# Patient Record
Sex: FEMALE | Race: WHITE | NOT HISPANIC OR LATINO | Employment: OTHER | URBAN - METROPOLITAN AREA
[De-identification: names, ages, dates, MRNs, and addresses within clinical notes are randomized per-mention and may not be internally consistent; named-entity substitution may affect disease eponyms.]

---

## 2023-07-25 ENCOUNTER — HOSPITAL ENCOUNTER (OUTPATIENT)
Facility: HOSPITAL | Age: 88
Discharge: HOME HEALTH | End: 2023-07-27
Attending: EMERGENCY MEDICINE | Admitting: INTERNAL MEDICINE
Payer: MEDICARE

## 2023-07-25 DIAGNOSIS — I10 PRIMARY HYPERTENSION: ICD-10-CM

## 2023-07-25 DIAGNOSIS — M89.8X5 PAIN IN RIGHT FEMUR: ICD-10-CM

## 2023-07-25 DIAGNOSIS — S70.01XA HEMATOMA OF RIGHT HIP, INITIAL ENCOUNTER: Primary | ICD-10-CM

## 2023-07-25 LAB
ALBUMIN SERPL BCP-MCNC: 4 G/DL (ref 3.5–5.2)
ALP SERPL-CCNC: 79 U/L (ref 55–135)
ALT SERPL W/O P-5'-P-CCNC: 21 U/L (ref 10–44)
ANION GAP SERPL CALC-SCNC: 7 MMOL/L (ref 8–16)
AST SERPL-CCNC: 18 U/L (ref 10–40)
BASOPHILS # BLD AUTO: 0.03 K/UL (ref 0–0.2)
BASOPHILS NFR BLD: 0.4 % (ref 0–1.9)
BILIRUB SERPL-MCNC: 0.7 MG/DL (ref 0.1–1)
BUN SERPL-MCNC: 13 MG/DL (ref 8–23)
CALCIUM SERPL-MCNC: 10.8 MG/DL (ref 8.7–10.5)
CHLORIDE SERPL-SCNC: 103 MMOL/L (ref 95–110)
CK SERPL-CCNC: 60 U/L (ref 20–180)
CO2 SERPL-SCNC: 32 MMOL/L (ref 23–29)
CREAT SERPL-MCNC: 0.7 MG/DL (ref 0.5–1.4)
DIFFERENTIAL METHOD: ABNORMAL
EOSINOPHIL # BLD AUTO: 0.1 K/UL (ref 0–0.5)
EOSINOPHIL NFR BLD: 0.8 % (ref 0–8)
ERYTHROCYTE [DISTWIDTH] IN BLOOD BY AUTOMATED COUNT: 13.6 % (ref 11.5–14.5)
EST. GFR  (NO RACE VARIABLE): >60 ML/MIN/1.73 M^2
GLUCOSE SERPL-MCNC: 97 MG/DL (ref 70–110)
HCT VFR BLD AUTO: 41.6 % (ref 37–48.5)
HGB BLD-MCNC: 13.4 G/DL (ref 12–16)
IMM GRANULOCYTES # BLD AUTO: 0.02 K/UL (ref 0–0.04)
IMM GRANULOCYTES NFR BLD AUTO: 0.2 % (ref 0–0.5)
LYMPHOCYTES # BLD AUTO: 0.9 K/UL (ref 1–4.8)
LYMPHOCYTES NFR BLD: 11.3 % (ref 18–48)
MCH RBC QN AUTO: 31.8 PG (ref 27–31)
MCHC RBC AUTO-ENTMCNC: 32.2 G/DL (ref 32–36)
MCV RBC AUTO: 99 FL (ref 82–98)
MONOCYTES # BLD AUTO: 0.4 K/UL (ref 0.3–1)
MONOCYTES NFR BLD: 5.2 % (ref 4–15)
NEUTROPHILS # BLD AUTO: 6.8 K/UL (ref 1.8–7.7)
NEUTROPHILS NFR BLD: 82.1 % (ref 38–73)
NRBC BLD-RTO: 0 /100 WBC
PLATELET # BLD AUTO: 167 K/UL (ref 150–450)
PMV BLD AUTO: 11.6 FL (ref 9.2–12.9)
POTASSIUM SERPL-SCNC: 3.7 MMOL/L (ref 3.5–5.1)
PROT SERPL-MCNC: 6.5 G/DL (ref 6–8.4)
RBC # BLD AUTO: 4.22 M/UL (ref 4–5.4)
SODIUM SERPL-SCNC: 142 MMOL/L (ref 136–145)
WBC # BLD AUTO: 8.33 K/UL (ref 3.9–12.7)

## 2023-07-25 PROCEDURE — 82550 ASSAY OF CK (CPK): CPT | Performed by: NURSE PRACTITIONER

## 2023-07-25 PROCEDURE — 25000003 PHARM REV CODE 250: Performed by: INTERNAL MEDICINE

## 2023-07-25 PROCEDURE — G0378 HOSPITAL OBSERVATION PER HR: HCPCS

## 2023-07-25 PROCEDURE — 85025 COMPLETE CBC W/AUTO DIFF WBC: CPT | Performed by: NURSE PRACTITIONER

## 2023-07-25 PROCEDURE — 99285 EMERGENCY DEPT VISIT HI MDM: CPT | Mod: 25

## 2023-07-25 PROCEDURE — 94760 N-INVAS EAR/PLS OXIMETRY 1: CPT

## 2023-07-25 PROCEDURE — 25000003 PHARM REV CODE 250: Performed by: NURSE PRACTITIONER

## 2023-07-25 PROCEDURE — 80053 COMPREHEN METABOLIC PANEL: CPT | Performed by: NURSE PRACTITIONER

## 2023-07-25 RX ORDER — POLYETHYLENE GLYCOL 3350 17 G/17G
17 POWDER, FOR SOLUTION ORAL
COMMUNITY
Start: 2023-06-30

## 2023-07-25 RX ORDER — ATORVASTATIN CALCIUM 20 MG/1
1 TABLET, FILM COATED ORAL DAILY
COMMUNITY

## 2023-07-25 RX ORDER — ACETAMINOPHEN 500 MG
1000 TABLET ORAL
Status: COMPLETED | OUTPATIENT
Start: 2023-07-25 | End: 2023-07-25

## 2023-07-25 RX ORDER — AMLODIPINE BESYLATE 5 MG/1
5 TABLET ORAL DAILY
Status: DISCONTINUED | OUTPATIENT
Start: 2023-07-25 | End: 2023-07-27 | Stop reason: HOSPADM

## 2023-07-25 RX ORDER — SENNOSIDES 8.6 MG/1
8.6 TABLET ORAL
Status: ON HOLD | COMMUNITY
Start: 2023-07-01 | End: 2023-07-27 | Stop reason: HOSPADM

## 2023-07-25 RX ORDER — VIT C/E/ZN/COPPR/LUTEIN/ZEAXAN 250MG-90MG
1000 CAPSULE ORAL
COMMUNITY

## 2023-07-25 RX ORDER — CALCIUM CARBONATE 200(500)MG
500 TABLET,CHEWABLE ORAL DAILY
COMMUNITY

## 2023-07-25 RX ORDER — TALC
6 POWDER (GRAM) TOPICAL NIGHTLY PRN
Status: DISCONTINUED | OUTPATIENT
Start: 2023-07-25 | End: 2023-07-27 | Stop reason: HOSPADM

## 2023-07-25 RX ORDER — DOCUSATE SODIUM 100 MG/1
100 CAPSULE, LIQUID FILLED ORAL 2 TIMES DAILY
COMMUNITY
Start: 2023-06-30

## 2023-07-25 RX ORDER — ATORVASTATIN CALCIUM 20 MG/1
20 TABLET, FILM COATED ORAL NIGHTLY
Status: DISCONTINUED | OUTPATIENT
Start: 2023-07-25 | End: 2023-07-27 | Stop reason: HOSPADM

## 2023-07-25 RX ORDER — ACETAMINOPHEN 325 MG/1
650 TABLET ORAL EVERY 8 HOURS PRN
Status: DISCONTINUED | OUTPATIENT
Start: 2023-07-25 | End: 2023-07-27 | Stop reason: HOSPADM

## 2023-07-25 RX ORDER — ASPIRIN 81 MG/1
81 TABLET ORAL DAILY
COMMUNITY

## 2023-07-25 RX ORDER — AMLODIPINE BESYLATE 5 MG/1
5 TABLET ORAL DAILY
COMMUNITY
End: 2023-10-03 | Stop reason: SDUPTHER

## 2023-07-25 RX ORDER — SODIUM CHLORIDE 0.9 % (FLUSH) 0.9 %
10 SYRINGE (ML) INJECTION
Status: DISCONTINUED | OUTPATIENT
Start: 2023-07-25 | End: 2023-07-27 | Stop reason: HOSPADM

## 2023-07-25 RX ORDER — ASPIRIN 81 MG/1
81 TABLET ORAL DAILY
Status: DISCONTINUED | OUTPATIENT
Start: 2023-07-26 | End: 2023-07-27 | Stop reason: HOSPADM

## 2023-07-25 RX ADMIN — AMLODIPINE BESYLATE 5 MG: 5 TABLET ORAL at 08:07

## 2023-07-25 RX ADMIN — ATORVASTATIN CALCIUM 20 MG: 20 TABLET, FILM COATED ORAL at 08:07

## 2023-07-25 RX ADMIN — ACETAMINOPHEN 1000 MG: 500 TABLET ORAL at 10:07

## 2023-07-25 NOTE — PLAN OF CARE
07/25/23 1349   ED Admissions Case Approval   ED Admissions Case Approval CM Approved         Utilization review    Chart review complete. Admission criteria MET at OBSERVATION level of care. Please see review below.     General Criteria: Observation Care - Observation Care Admission Criteria by Nichole Rothman RN       Met: Reviewed on 7/25/2023 by Nichole Rothman RN       Created Using Review Status Review Entered   [employer]icia® Completed 7/25/2023 1349       Created By   Nichole Rothman RN       Criteria Set Name - Subset   General Criteria: Observation Care - Observation Care Admission Criteria      Criteria Review      Observation Care Admission Criteria    Most Recent : Nichole Rothman Most Recent Date: 07/25/2023 1:49 PM    (X) Observation care [A] [B] [C] [D] is indicated for  ALL  of the following  (1) (2) (3) (4)    (5) (6) (7) (8) (9) (10):       (X) Clinical care (eg, testing, monitoring, or treatment) needed beyond the usual emergency department       time frame (eg, 3 to 4 hours)       (X) Clinical care needed is not appropriate for a lower level of care (ie, discharge to outpatient       setting not appropriate).       (X) Patient has clinical condition for which observation care is needed, as indicated by  1 or       more  of the following :          (X) Other condition or finding (eg, laboratory test, imaging study, sign, symptom) that suggests          a need for continued care, as indicated by  1 or more  of the following  (11) (15):             (X) Acute need to establish diagnosis (eg, repeat or continued testing or clinical assessments             are needed)             (X) Acute treatment needed

## 2023-07-25 NOTE — ED TRIAGE NOTES
C/o losing her balance while trying to get to the bathroom this morning and falling hitting her posterior head on the corner of the wall. Patient with large amount of swelling to right hip/thigh area. Patient doesn't recall hitting her hip. Denies loc.

## 2023-07-25 NOTE — ED PROVIDER NOTES
"Encounter Date: 7/25/2023       History     Chief Complaint   Patient presents with    Fall     Ashley Mak is a 87 y.o. female with PMH of CAD, HTN who presents to the ED for evaluation of head trauma and right hip pain after fall.  Reports slipping on wet floor while trying to get to the bathroom causing her to fall. She hit her posterior head on the wall prior to falling onto the floor. There was no reported LOC.   She presents with pain to her posterior head with associated swelling in addition to R hip pain with swelling.   She also reports posterior neck pain that has been chronic since recent C1 fracture on 06/24/23 resulting from a fall. She has a soft neck collar that she reports she only wears when needed.   She has no numbness/tingling BUE/BLE.   Large amount of swelling with bruising to R hip with only mild pain. She reports that she was able to walk after fall without difficulty.   Denies use of blood thinners; she does take ASA daily.  She is originally from [place] and is here visiting her daughter.       The history is provided by the patient.   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 No pertinent family history.  Social History     Tobacco Use    Smoking status: Never    Smokeless tobacco: Never   Substance Use Topics    Alcohol use: Not Currently    Drug use: Never     Review of Systems   Constitutional:  Negative for fever.   HENT:  Negative for sore throat.    Respiratory:  Negative for chest tightness and shortness of breath.    Cardiovascular:  Negative for chest pain.   Gastrointestinal:  Negative for abdominal pain and nausea.   Genitourinary:  Negative for dysuria, frequency and urgency.   Musculoskeletal:  Positive for " arthralgias (R hip) and gait problem. Negative for back pain.   Skin:  Positive for wound (R facial bruising from previous fall; R hip). Negative for rash.   Neurological:  Negative for dizziness, weakness, light-headedness and numbness.   Hematological:  Does not bruise/bleed easily.     Physical Exam     Initial Vitals [07/25/23 0911]   BP Pulse Resp Temp SpO2   (!) 190/81 69 18 96.9 °F (36.1 °C) 99 %      MAP       --         Physical Exam    Nursing note and vitals reviewed.  Constitutional: She appears well-developed and well-nourished.   HENT:   Head: Normocephalic and atraumatic.       R facial bruising    Eyes: Conjunctivae and EOM are normal. Pupils are equal, round, and reactive to light.   Neck: Neck supple.   Cardiovascular:  Normal rate, regular rhythm, normal heart sounds and intact distal pulses.           Pulmonary/Chest: Breath sounds normal.   Abdominal: Abdomen is soft. Bowel sounds are normal.   Musculoskeletal:      Cervical back: Neck supple.      Right hip: Deformity and tenderness present. Decreased range of motion.      Comments: + 2 R pedal pulse with good cap refill      Neurological: She is alert and oriented to person, place, and time. She has normal strength. No cranial nerve deficit or sensory deficit. GCS eye subscore is 4. GCS verbal subscore is 5. GCS motor subscore is 6.   Skin: Skin is warm and dry. Capillary refill takes less than 2 seconds. Bruising (R facial from previous fall) and ecchymosis (R hip) noted.   Psychiatric: She has a normal mood and affect. Her behavior is normal. Judgment and thought content normal.             ED Course   Procedures  Labs Reviewed   CBC W/ AUTO DIFFERENTIAL - Abnormal; Notable for the following components:       Result Value    MCV 99 (*)     MCH 31.8 (*)     Lymph # 0.9 (*)     Gran % 82.1 (*)     Lymph % 11.3 (*)     All other components within normal limits   COMPREHENSIVE METABOLIC PANEL - Abnormal; Notable for the following components:     CO2 32 (*)     Calcium 10.8 (*)     Anion Gap 7 (*)     All other components within normal limits   CK          Imaging Results              CT Pelvis Without Contrast (Final result)  Result time 07/25/23 10:39:24      Final result by Audie Mccloud Jr., MD (07/25/23 10:39:24)                   Impression:      1. Soft tissue mass projecting over the right gluteal region related to a soft tissue hematoma formation, though no evidence of abnormality involving the at adjacent femur.  2. 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  3. Small sclerotic line projecting along the apical edge of the S1 vertebral body likely related to an old fragility fracture may be further addressed by MR evaluation.  4. The above findings were conveyed to Dr. Romero on 07/25/2023 at 10:38.      Electronically signed by: Audie Mccloud MD  Date:    07/25/2023  Time:    10:39               Narrative:    EXAMINATION:  CT PELVIS WITHOUT CONTRAST    CLINICAL HISTORY:  Pelvic trauma;hematoma/swelling R hip;    TECHNIQUE:  Standard cross-section evaluation of the pelvis was completed utilizing a multidetector scanner with supplemental coronal inside obstruction images also included along with the standard dataset provided.    COMPARISON:  No previous comparison study is made available.    FINDINGS:  Soft tissue mass projects over the patient's right greater trochanter showing evidence of fairly advanced inflammatory density change attributed towards a large posttraumatic hematoma formation, deep edge approaching the greater tubercle, though the long shaft of the femur appears intact.  Femoral head is well seated within the acetabular compartment with evidence of marginal sclerosis noted about the apical margin.  Concentric narrowing of the acetabular cartilage space.  Small bone island is imbedded within the lateral edge of the right hemipelvis.  Left hip shows evidence of normal articulation with  the articular cartilage space, coarse trabecular markings along the apical edge attributed towards early AVN.  No evidence of fracture.  There is a subtle sclerotic line traversing the apical edge of the sacrum on the sagittal inspection potentially related to an old fragility fracture.  There is no significant widening the symphysis pubis.  The SI joints are mildly arthritic prominent osteophytic spur projected over the left SI joint.  The pelvic structures revealed no evidence of significant abnormality.  There is evidence of extensive atheromatous calcification of the abdominal aorta and common iliac vessels.  The urinary bladder is filled to capacity.                                       CT Cervical Spine Without Contrast (Final result)  Result time 07/25/23 10:21:56      Final result by Audie Mccloud Jr., MD (07/25/23 10:21:56)                   Impression:      1. No CT evidence of acute traumatic injury.  2. Mild predominant right-sided mid to lower lumbar spine facet arthrosis.  3. Chronic degenerative disc disease isolated to the C5/C6 intervertebral disc with bilateral uncinate joint spur formation dominant towards the left resulting root impaction.  4. Chronic atlantal dens osteoarthritis with prominent osteophytic spurring projecting towards the right of midline.      Electronically signed by: Audie Mccloud MD  Date:    07/25/2023  Time:    10:21               Narrative:      CMS MANDATED QUALITY DATA - CT RADIATION - 436    All CT scans at this facility utilize dose modulation, iterative reconstruction, and/or weight based dosing when appropriate to reduce radiation dose to as low as reasonably achievable    EXAMINATION:  CT CERVICAL SPINE WITHOUT CONTRAST    CLINICAL HISTORY:  Neck trauma (Age >= 65y);hx C1 fracture;    TECHNIQUE:  Low dose axial images, sagittal and coronal reformations were performed though the cervical spine.  Contrast was not  administered.    COMPARISON:  None.    FINDINGS:  Skull Base and Craniocervical Junction (partially imaged): Chronic degenerative changes across the atlantal dens articulation with prominent osteophytic spurring propagating from the dorsal edge of the anterior arch of C1, however the ring of C1 remains intact.  The atlantal dens interval appears well maintained.    Spinal Alignment: Marked hyperlordosis of the spinal convexity.    Vertebrae: No evidence of spinal compression fracture.    Discs: Chronic degenerative disc disease narrowing established at the C5/C6 interspace level with prominent dorsal osteophytic spurring encroaching upon the spinal canal.  Mild bilateral uncinate joint spur formation.    Degenerative findings: Mild multilevel predominant right-sided lower cervical spine facet arthrosis.    Paraspinal muscles & soft tissues: Extensive atheromatous calcifications noted about the carotid bifurcation leftward.  Low-density nodule is imbedded within the right thyroid gland as an incidental finding.  Mild apical pleural capping.                                       X-Ray Femur Ap/Lat Right (Final result)  Result time 07/25/23 10:27:57      Final result by Audie Mccloud Jr., MD (07/25/23 10:27:57)                   Impression:      Soft tissue induration over the gluteal soft tissues nonspecific finding may be attributed towards a post inflammatory process pressure related trochanteric bursitis.  If so desired, further evaluation with MRI is in order      Electronically signed by: Audie Mccloud MD  Date:    07/25/2023  Time:    10:27               Narrative:    EXAMINATION:  XR FEMUR 2 VIEW RIGHT    CLINICAL HISTORY:  Other specified disorders of bone, thigh    TECHNIQUE:  AP and lateral views of the right femur were performed.    COMPARISON:  None    FINDINGS:  Multi views the right femur in the AP and lateral projection reveal mild narrowing of the articular cartilage space with maintenance  involving the sphericity of the femoral head.  Long shaft of the femur appears well maintained with preservation involving the cortical outline.  There are prominent atheromatous calcifications of the extremity vessels.  Prominent induration and soft tissue inflammatory changes are established over the lateral gluteal soft tissues factors that may reflect factors of trochanteric bursitis.  Further evaluation with MRI is advised.  Patient has undergone previous right knee arthroplasty with cemented tibial component.                                       CT Head Without Contrast (Final result)  Result time 07/25/23 10:17:37      Final result by Audie Mccloud Jr., MD (07/25/23 10:17:37)                   Impression:      1. 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  2. Mild prominence of the bilateral ventricles with evidence of marginal areas of low-density suspect for mild transependymal resorption of CSF.  3. Mild cerebral atrophy with superimposed chronic deep white matter ischemia.      Electronically signed by: Audie Mccloud MD  Date:    07/25/2023  Time:    10:17               Narrative:    EXAMINATION:  CT HEAD WITHOUT CONTRAST    CLINICAL HISTORY:  Head trauma, minor (Age >= 65y);    TECHNIQUE:  Low dose axial images were obtained through the head.  Coronal and sagittal reformations were also performed. Contrast was not administered.    COMPARISON:  Correlation is made with the patient's previous outside report 06/24/2023.  However, the patient has prior imaging studies not available for direct review.    FINDINGS:  The reference is predominant solid and cystic mass in the infratentorial compartment right infratentorial fossa has been described from previous exam with soft tissue component along the anterior edge reproducing marked mass effect upon the brainstem  structures reproducing leftward shift of the midbrain region.  The left cerebellar hemisphere appears unremarkable.  There is evidence of significant mass effect imposed upon the 4th ventricle.  Findings inducing minimal hydronephrosis of the lateral ventricles.  Marginal areas low-density are identified about the posterior and anterior horns of lateral ventricles secondary to transependymal CSF.  There is pre-existing cerebral atrophy marked deep white matter ischemic changes.  No evidence of intracranial hemorrhage.  Orbits retrobulbar compartments are normal.                                       Medications   sodium chloride 0.9% flush 10 mL (has no administration in time range)   melatonin tablet 6 mg (has no administration in time range)   acetaminophen tablet 650 mg (has no administration in time range)   acetaminophen tablet 1,000 mg (1,000 mg Oral Given 7/25/23 1049)     Medical Decision Making:   Differential Diagnosis:   ICH, SDH, SAH, Skull fx,   Cervical fx  R hip fx, hematoma   Clinical Tests:   Lab Tests: Ordered and Reviewed  Radiological Study: Ordered and Reviewed  ED Management:  Evaluation of an 86 yo female with mechanical fall at home with head trauma, neck pain and R hip pain with swelling/bruising.   Presents with no focal deficits on exam. R sided facial bruising that patient notes is old from previous fall about 1 mo ago (06/25/23) where she also suffered a C 1 fx.   R hip with large hematoma. Good distal pulses. Remains with full ROM, no limb shortening.  CT head shows no acute changes. 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  2. Mild prominence of the bilateral ventricles with evidence of marginal areas of low-density suspect for mild transependymal resorption of CSF.    Ct cervical spine with no acute traumatic injury    CT  Pelvis   1.Soft tissue mass projecting over the right gluteal region related to a soft tissue hematoma formation, though no evidence of abnormality involving the at adjacent femur.  2. 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  3. Small sclerotic line projecting along the apical edge of the S1 vertebral body likely related to an old fragility fracture may be further addressed by MR evaluation.    Lab workup with stable H/H  Patient also examined by collaborating provider. Given large hematoma; will admit to observation to monitor hematoma.  Dr. Romero spoke to Dr. Moore who agrees to admit patient for observation.   .ew                        Clinical Impression:   Final diagnoses:  [M89.8X5] Pain in right femur  [S70.01XA] Hematoma of right hip, initial encounter (Primary)        ED Disposition Condition    Observation Stable                Shasha Rothman NP  07/25/23 8265

## 2023-07-26 PROBLEM — I25.10 CAD (CORONARY ARTERY DISEASE): Status: ACTIVE | Noted: 2023-07-26

## 2023-07-26 PROBLEM — S30.0XXA TRAUMATIC HEMATOMA OF BUTTOCK: Status: ACTIVE | Noted: 2023-07-26

## 2023-07-26 PROBLEM — I10 PRIMARY HYPERTENSION: Status: ACTIVE | Noted: 2023-07-26

## 2023-07-26 LAB
BASOPHILS # BLD AUTO: 0.03 K/UL (ref 0–0.2)
BASOPHILS NFR BLD: 0.4 % (ref 0–1.9)
CK SERPL-CCNC: 51 U/L (ref 20–180)
DIFFERENTIAL METHOD: ABNORMAL
EOSINOPHIL # BLD AUTO: 0.1 K/UL (ref 0–0.5)
EOSINOPHIL NFR BLD: 1.8 % (ref 0–8)
ERYTHROCYTE [DISTWIDTH] IN BLOOD BY AUTOMATED COUNT: 13.4 % (ref 11.5–14.5)
HCT VFR BLD AUTO: 37.1 % (ref 37–48.5)
HGB BLD-MCNC: 12 G/DL (ref 12–16)
IMM GRANULOCYTES # BLD AUTO: 0.03 K/UL (ref 0–0.04)
IMM GRANULOCYTES NFR BLD AUTO: 0.4 % (ref 0–0.5)
LYMPHOCYTES # BLD AUTO: 1.3 K/UL (ref 1–4.8)
LYMPHOCYTES NFR BLD: 16.4 % (ref 18–48)
MCH RBC QN AUTO: 32 PG (ref 27–31)
MCHC RBC AUTO-ENTMCNC: 32.3 G/DL (ref 32–36)
MCV RBC AUTO: 99 FL (ref 82–98)
MONOCYTES # BLD AUTO: 0.5 K/UL (ref 0.3–1)
MONOCYTES NFR BLD: 5.9 % (ref 4–15)
NEUTROPHILS # BLD AUTO: 5.8 K/UL (ref 1.8–7.7)
NEUTROPHILS NFR BLD: 75.1 % (ref 38–73)
NRBC BLD-RTO: 0 /100 WBC
PLATELET # BLD AUTO: 153 K/UL (ref 150–450)
PMV BLD AUTO: 11.5 FL (ref 9.2–12.9)
RBC # BLD AUTO: 3.75 M/UL (ref 4–5.4)
WBC # BLD AUTO: 7.75 K/UL (ref 3.9–12.7)

## 2023-07-26 PROCEDURE — 94760 N-INVAS EAR/PLS OXIMETRY 1: CPT

## 2023-07-26 PROCEDURE — 25000003 PHARM REV CODE 250: Performed by: NURSE PRACTITIONER

## 2023-07-26 PROCEDURE — 25000003 PHARM REV CODE 250: Performed by: INTERNAL MEDICINE

## 2023-07-26 PROCEDURE — 82550 ASSAY OF CK (CPK): CPT | Performed by: NURSE PRACTITIONER

## 2023-07-26 PROCEDURE — 99222 PR INITIAL HOSPITAL CARE,LEVL II: ICD-10-PCS | Mod: ,,, | Performed by: PHYSICIAN ASSISTANT

## 2023-07-26 PROCEDURE — 99222 1ST HOSP IP/OBS MODERATE 55: CPT | Mod: ,,, | Performed by: PHYSICIAN ASSISTANT

## 2023-07-26 PROCEDURE — 97165 OT EVAL LOW COMPLEX 30 MIN: CPT

## 2023-07-26 PROCEDURE — 25000003 PHARM REV CODE 250: Performed by: PHYSICIAN ASSISTANT

## 2023-07-26 PROCEDURE — 97162 PT EVAL MOD COMPLEX 30 MIN: CPT

## 2023-07-26 PROCEDURE — 36415 COLL VENOUS BLD VENIPUNCTURE: CPT | Performed by: NURSE PRACTITIONER

## 2023-07-26 PROCEDURE — 85025 COMPLETE CBC W/AUTO DIFF WBC: CPT | Performed by: NURSE PRACTITIONER

## 2023-07-26 PROCEDURE — G0378 HOSPITAL OBSERVATION PER HR: HCPCS

## 2023-07-26 RX ORDER — DOCUSATE SODIUM 100 MG/1
100 CAPSULE, LIQUID FILLED ORAL 2 TIMES DAILY
Status: DISCONTINUED | OUTPATIENT
Start: 2023-07-26 | End: 2023-07-27 | Stop reason: HOSPADM

## 2023-07-26 RX ORDER — POLYETHYLENE GLYCOL 3350 17 G/17G
17 POWDER, FOR SOLUTION ORAL DAILY
Status: DISCONTINUED | OUTPATIENT
Start: 2023-07-26 | End: 2023-07-26

## 2023-07-26 RX ORDER — POLYETHYLENE GLYCOL 3350 17 G/17G
17 POWDER, FOR SOLUTION ORAL DAILY
Status: DISCONTINUED | OUTPATIENT
Start: 2023-07-26 | End: 2023-07-27 | Stop reason: HOSPADM

## 2023-07-26 RX ADMIN — DOCUSATE SODIUM 100 MG: 100 CAPSULE, LIQUID FILLED ORAL at 08:07

## 2023-07-26 RX ADMIN — POLYETHYLENE GLYCOL 3350 17 G: 17 POWDER, FOR SOLUTION ORAL at 09:07

## 2023-07-26 RX ADMIN — ASPIRIN 81 MG: 81 TABLET, COATED ORAL at 09:07

## 2023-07-26 RX ADMIN — DOCUSATE SODIUM 100 MG: 100 CAPSULE, LIQUID FILLED ORAL at 09:07

## 2023-07-26 RX ADMIN — ACETAMINOPHEN 650 MG: 325 TABLET ORAL at 12:07

## 2023-07-26 RX ADMIN — ATORVASTATIN CALCIUM 20 MG: 20 TABLET, FILM COATED ORAL at 08:07

## 2023-07-26 RX ADMIN — AMLODIPINE BESYLATE 5 MG: 5 TABLET ORAL at 09:07

## 2023-07-26 NOTE — PLAN OF CARE
Problem: Fall Injury Risk  Goal: Absence of Fall and Fall-Related Injury  Outcome: Ongoing, Progressing     Problem: Skin Injury Risk Increased  Goal: Skin Health and Integrity  Outcome: Ongoing, Progressing     Problem: Pain Acute  Goal: Acceptable Pain Control and Functional Ability  Outcome: Ongoing, Progressing

## 2023-07-26 NOTE — PT/OT/SLP EVAL
"Occupational Therapy   Evaluation    Name: Ashley Mak  MRN: [id number]  Admitting Diagnosis: Traumatic hematoma of buttock  Recent Surgery: * No surgery found *      Recommendations:     Discharge Recommendations: home with home health, home health PT  Discharge Equipment Recommendations:  none  Barriers to discharge:  Other (Comment) (Increased weakness)    Assessment:     Ashley Mak is a 87 y.o. female with a medical diagnosis of Traumatic hematoma of buttock.  She presents with performance deficits affecting function: weakness, impaired endurance, impaired self care skills, impaired balance, gait instability, impaired functional mobility, decreased upper extremity function, decreased safety awareness.      Rehab Prognosis: Fair; patient would benefit from acute skilled OT services to address these deficits and reach maximum level of function.       Plan:     Patient to be seen 5 x/week to address the above listed problems via self-care/home management, therapeutic activities, therapeutic exercises  Plan of Care Expires: 08/09/23  Plan of Care Reviewed with: patient, daughter    Subjective     Chief Complaint: "I can forget sometimes."  Patient/Family Comments/goals: To go home.    Occupational Profile:  Living Environment: Pt lives with family in [place] in [facility] with 1 [address].  Previous level of function: Pt and daughter report independent > MOD A occasionally "when I need it" per Pt.   Equipment Used at Home: cane, straight, rollator, shower chair, grab bar  Assistance upon Discharge: Family    Pain/Comfort:  Pain Rating 1: 0/10  Pain Rating Post-Intervention 1: 0/10    Patients cultural, spiritual, Adventism conflicts given the current situation: no    Objective:     Communicated with: Nursing prior to session.  Patient found HOB elevated with peripheral IV upon OT entry to room.    General Precautions: Standard, fall  Orthopedic Precautions: N/A  Braces: N/A  Respiratory Status: Room air    Occupational " Performance:    Bed Mobility:    Patient completed Rolling/Turning to Left with  contact guard assistance  Patient completed Supine to Sit with contact guard assistance    Functional Mobility/Transfers:  Patient completed Sit <> Stand Transfer with contact guard assistance  with  rolling walker   Patient completed Toilet Transfer Step Transfer technique with modified independence with  hand-held assist and grab bars  Functional Mobility: Pt ambulated ~20 feet to and from bathroom using RW.    Activities of Daily Living:  Grooming: stand by assistance for  safety in standing oral care and face washing at sink.   Upper Body Dressing: moderate assistance to bring gown over shoulders seated EOB.  Lower Body Dressing: stand by assistance for safety with seated balance.  Toileting: modified independence with RW and grab bar. Pt able to lower brief to knees and complete hygiene.     Cognitive/Visual Perceptual:  Cognitive/Psychosocial Skills:     -       Oriented to: Person, Place, Time, and Situation   -       Follows Commands/attention:Follows multistep  commands  -       Communication: clear/fluent  -       Memory: Pt with difficulty recalling details of [facility] which began following recent fall.     Physical Exam:  Balance:    -       Good > poor balance noted in sitting with significant posterior lean during dressing with Pt verbalizing as new occurrence since fall. Fair standing balance noted with 2 minor LOB in bathroom while standing at sink.   Postural examination/scapula alignment:    -       Rounded shoulders  -       Forward head  Skin integrity: Bruising of right side of face.  Upper Extremity Range of Motion:     -       Right Upper Extremity: WFL  -       Left Upper Extremity: WFL  Upper Extremity Strength:    -       Right Upper Extremity: WFL  -       Left Upper Extremity: WFL   Strength:    -       Right Upper Extremity: WFL  -       Left Upper Extremity: WFL    [facility] 6 Click ADL:  [facility] Total  "Score: 19    Treatment & Education:  OT evaluation completed with Pt and daughter present. Both educated on role of OT and POC as well as trigger finger massage for right 4th digit.     Patient left ambulatory in room/freeman with PT and  nursing notified and daughter present    GOALS:   Multidisciplinary Problems       Occupational Therapy Goals          Problem: Occupational Therapy    Goal Priority Disciplines Outcome Interventions   Occupational Therapy Goal     OT, PT/OT     Description: Pt to perform UB dressing with MOD I and seated EOB.  Pt to perform LB dressing with MOD I seated EOB.   Pt to perform level functional transfers required for ADL's with MOD I, RW level.  Pt to demonstrate Good dynamic standing balance as required to perform ADL's from standing level.  Pt to complete standing ADL task for >5 minutes with no LOB occurrences for increased safety and independence upon discharge.                        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

## 2023-07-26 NOTE — PT/OT/SLP EVAL
"Physical Therapy Evaluation     Patient Name: Ashley Mak   MRN: [id number]  Recent Surgery: * No surgery found *      Recommendations:     Discharge Recommendations: home with home health, home health PT   Discharge Equipment Recommendations: none   Barriers to discharge: Increased level of assist    Assessment:     Ashley Mak is a 87 y.o. female admitted with a medical diagnosis of Traumatic hematoma of buttock. She presents with the following impairments/functional limitations: weakness, impaired endurance, impaired self care skills, impaired functional mobility, gait instability, impaired balance, decreased lower extremity function, decreased safety awareness, decreased ROM that are causing the pt to have decreased independence and safety with all gait and mobility tasks.  Pt requires skilled PT treatment to increase independence and safety with all gait and mobility tasks to return to home    Rehab Prognosis: Fair; patient would benefit from acute PT services to address these deficits and reach maximum level of function.    Plan:     During this hospitalization, patient to be seen 5 x/week to address the above listed problems via gait training, therapeutic activities, therapeutic exercises    Plan of Care Expires: 08/02/23    Subjective     Chief Complaint: none stated  Patient Comments/Goals: "I'm feeling better"  Pain/Comfort:  Pain Rating 1: 0/10 (stated that "movement helped")    Social History:  Living Environment: Patient lives with their child(bryon) in a single story home with number of outside stair(s): 0  Prior Level of Function: Prior to admission, patient was modified independent  Equipment Used at Home: cane, straight, rollator, shower chair  DME owned (not currently used): none  Assistance Upon Discharge: family    Objective:     Communicated with pt prior to session. Patient found sitting edge of bed with peripheral IV upon PT entry to room.    General Precautions: Standard, fall   Orthopedic " Precautions: N/A   Braces: N/A    Respiratory Status: Room air    Exams:  Cognition: Patient is oriented to Person and Place  RLE ROM: WFL except hip AROM  RLE Strength: Deficits: 4/5 to ankle and knee, 2/5 to hip  LLE ROM: WFL except hip AROM  LLE Strength: Deficits: 4/5 to ankle and knee, 2/5 to hip  Fine Motor Coordination:    -       Intact  Gross Motor Coordination: WFL  Postural Exam: Patient presented with the following abnormalities:    -       Rounded shoulders  -       Forward head  -       Kyphosis  Sensation:    -       Intact  Skin Integrity/Edema:     -       Skin integrity: Thin  -       Edema: None noted BLEs    Functional Mobility:  Gait belt applied - Yes  Bed Mobility  Rolling Right: contact guard assistance  Scooting: minimum assistance  Sit to Supine: contact guard assistance for trunk management  Transfers  Sit to Stand: stand by assistance with rolling walker  Gait  Patient ambulated 50' with rolling walker and contact guard assistance. Patient demonstrates occasional unsteady gait.  Balance  Sitting: contact guard assistance  Standing: contact guard assistance    Therapeutic Activities and Exercises:   Patient educated on role of acute care PT and PT POC    AM-PAC 6 CLICK MOBILITY  Total Score:18    Patient left HOB elevated with all lines intact and call button in reach.    GOALS:   Multidisciplinary Problems       Physical Therapy Goals          Problem: Physical Therapy    Goal Priority Disciplines Outcome Goal Variances Interventions   Physical Therapy Goal     PT, PT/OT Ongoing, Progressing     Description: Patient will increase functional independence with mobility by performin. Supine to sit with Modified Wilson  2. Sit to supine with Modified Wilson  3. Rolling to Left and Right with Modified Wilson.  4. Sit to stand transfer with Supervision  5. Bed to chair transfer with Supervision using Rolling Walker  6. Gait  x 100 feet with Stand-by Assistance using  "Rolling Walker.   7. 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

## 2023-07-26 NOTE — HPI
Patient is a 87 year old female with medical history of HTN, known brain mass and CAD with stent who presented to the ED after fall.  She slipped on a wet floor causing her to fall.  She hit the back of her head and right hip.  She has chronic neck pain.  After falling she was able to walk but is having right hip pain.  She takes aspirin for her CAD but not on anticoagulation.      Admitted for large right gluteal hematoma.  Monitoring h/h.

## 2023-07-26 NOTE — PLAN OF CARE
07/26/23 1003   SPIVEY Message   Medicare Outpatient and Observation Notification regarding financial responsibility Given to patient/caregiver;Explained to patient/caregiver;Signed/date by patient/caregiver   Date SPIVEY was signed 07/26/23   Time SPIVEY was signed [id number]

## 2023-07-26 NOTE — ASSESSMENT & PLAN NOTE
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  No acute hemorrhage

## 2023-07-26 NOTE — NURSING
"Pt with urge to void, requesting to ambulate to the restroom. Pt with cane,unsteady gait, assisted by X 2 staff. Educated patient the importance of using the call bell before trying to get out of bed. Pt loosing balance despite using cane. Pt safely back in bed, educated importance of using a bedside commode or bed pan. Pt upset and frustrated stating "why is everyone trying to immobilize me." Pt educated that we are concerned that she will fall and that for her safety it is best to call before getting out of bed.   "

## 2023-07-26 NOTE — PLAN OF CARE
Problem: Adult Inpatient Plan of Care  Goal: Plan of Care Review  Outcome: Ongoing, Progressing  Flowsheets (Taken 7/26/2023 9820)  Plan of Care Reviewed With:   patient   family     Problem: Fall Injury Risk  Goal: Absence of Fall and Fall-Related Injury  Outcome: Ongoing, Progressing     Problem: Skin Injury Risk Increased  Goal: Skin Health and Integrity  Outcome: Ongoing, Progressing     Problem: Balance Impairment (Functional Deficit)  Goal: Improved Balance and Postural Control  Outcome: Ongoing, Progressing

## 2023-07-26 NOTE — H&P
"[facility] ([address] [place] [address] Medicine  History & Physical    Patient Name: Ashley Mak  MRN: [id number]  Patient Class: OP- Observation  Admission Date: 7/25/2023  Attending Physician: Ely Moore MD   Primary Care Provider: Primary Doctor No         Patient information was obtained from ER records.     Subjective:     Principal Problem:Traumatic hematoma of buttock    Chief Complaint:   Chief Complaint   Patient presents with    Fall        HPI: Patient is a 87 year old female with medical history of HTN, known brain mass and CAD with stent who presented to the ED after fall.  She slipped on a wet floor causing her to fall.  She hit the back of her head and right hip.  She has chronic neck pain.  After falling she was able to walk but is having right hip pain.  She takes aspirin for her CAD but not on anticoagulation.      Admitted for large right gluteal hematoma.  Monitoring h/h.                  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     Current Outpatient Medications on File Prior to Encounter   Medication Sig    amLODIPine (NORVASC) 5 MG tablet Take 5 mg by mouth once daily.    aspirin (ECOTRIN) 81 MG EC tablet Take 81 mg by mouth once daily.    atorvastatin (LIPITOR) 20 MG tablet Take 1 tablet by mouth once daily.    calcium carbonate (TUMS) 200 mg calcium (500 mg) chewable tablet Take 500 mg by mouth once daily.    cholecalciferol, vitamin D3, (VITAMIN D3) 25 mcg (1,000 unit) capsule Take 1,000 Units by mouth.    linaCLOtide (LINZESS) 145 mcg Cap capsule Take 145 mcg by mouth.    MULTIVITAMIN ORAL Take 1 tablet by mouth once daily.    sodium chloride " (OCEAN) 0.65 % nasal spray 1 spray by Nasal route daily as needed.    docusate sodium (COLACE) 100 MG capsule Take 100 mg by mouth 2 (two) times daily.    polyethylene glycol (GLYCOLAX) 17 gram/dose powder Take 17 g by mouth.    senna (SENOKOT) 8.6 mg tablet Take 8.6 mg by mouth.     Family History    None       Tobacco Use    Smoking status: Never    Smokeless tobacco: Never   Substance and Sexual Activity    Alcohol use: Not Currently    Drug use: Never    Sexual activity: Not Currently     Review of Systems   Constitutional:  Negative for chills and fever.   HENT:  Negative for ear discharge and ear pain.    Eyes:  Negative for pain and discharge.   Respiratory:  Negative for cough and shortness of breath.    Cardiovascular:  Negative for chest pain and leg swelling.   Gastrointestinal:  Negative for nausea and vomiting.   Endocrine: Negative for cold intolerance and heat intolerance.   Genitourinary:  Negative for difficulty urinating and dysuria.   Musculoskeletal:  Positive for arthralgias. Negative for gait problem, joint swelling and myalgias.        Right hip pain    Skin:  Positive for color change. Negative for rash and wound.   Neurological:  Negative for dizziness and headaches.   Psychiatric/Behavioral:  Negative for agitation and confusion.    Objective:     Vital Signs (Most Recent):  Temp: 98.9 °F (37.2 °C) (07/26/23 0717)  Pulse: 73 (07/26/23 0717)  Resp: 18 (07/26/23 0717)  BP: (!) 158/65 (07/26/23 0717)  SpO2: 96 % (07/26/23 0717) Vital Signs (24h Range):  Temp:  [96.3 °F (35.7 °C)-99.2 °F (37.3 °C)] 98.9 °F (37.2 °C)  Pulse:  [59-73] 73  Resp:  [16-18] 18  SpO2:  [95 %-100 %] 96 %  BP: (136-178)/(63-77) 158/65     Weight: 50.9 kg (112 lb 3.4 oz)  Body mass index is 19.88 kg/m².     Physical Exam  Constitutional:       General: She is not in acute distress.  HENT:      Head: Normocephalic and atraumatic.   Eyes:      General:         Right eye: No discharge.         Left eye: No  discharge.   Cardiovascular:      Rate and Rhythm: Normal rate and regular rhythm.   Pulmonary:      Effort: Pulmonary effort is normal.      Breath sounds: Normal breath sounds.   Abdominal:      General: There is no distension.      Tenderness: There is no abdominal tenderness.   Musculoskeletal:         General: No swelling or tenderness.      Cervical back: Neck supple. No tenderness.   Skin:     General: Skin is warm and dry.      Comments: Large round bruise on right glute    Neurological:      General: No focal deficit present.      Mental Status: She is alert and oriented to person, place, and time.   Psychiatric:         Mood and Affect: Mood normal.         Behavior: Behavior normal.              Significant Labs: A1C: No results for input(s): HGBA1C in the last 4320 hours.  ABGs: No results for input(s): PH, PCO2, HCO3, POCSATURATED, BE, TOTALHB, COHB, METHB, O2HB, POCFIO2, PO2 in the last 48 hours.  Bilirubin:   Recent Labs   Lab 07/25/23  1128   BILITOT 0.7     Blood Culture: No results for input(s): LABBLOO in the last 48 hours.  BMP:   Recent Labs   Lab 07/25/23  1128   GLU 97      K 3.7      CO2 32*   BUN 13   CREATININE 0.7   CALCIUM 10.8*     CBC:   Recent Labs   Lab 07/25/23  1128 07/26/23  0635   WBC 8.33 7.75   HGB 13.4 12.0   HCT 41.6 37.1    153     CMP:   Recent Labs   Lab 07/25/23  1128      K 3.7      CO2 32*   GLU 97   BUN 13   CREATININE 0.7   CALCIUM 10.8*   PROT 6.5   ALBUMIN 4.0   BILITOT 0.7   ALKPHOS 79   AST 18   ALT 21   ANIONGAP 7*     Cardiac Markers: No results for input(s): CKMB, MYOGLOBIN, BNP, TROPISTAT in the last 48 hours.  Coagulation: No results for input(s): PT, INR, APTT in the last 48 hours.  Lactic Acid: No results for input(s): LACTATE in the last 48 hours.  Lipase: No results for input(s): LIPASE in the last 48 hours.  Lipid Panel: No results for input(s): CHOL, HDL, LDLCALC, TRIG, CHOLHDL in the last 48 hours.  Magnesium: No results  for input(s): MG in the last 48 hours.  POCT Glucose: No results for input(s): POCTGLUCOSE in the last 48 hours.  Prealbumin: No results for input(s): PREALBUMIN in the last 48 hours.  Respiratory Culture: No results for input(s): GSRESP, RESPIRATORYC in the last 48 hours.  Troponin: No results for input(s): TROPONINI, TROPONINIHS in the last 48 hours.  TSH: No results for input(s): TSH in the last 4320 hours.  Urine Culture: No results for input(s): LABURIN in the last 48 hours.  Urine Studies: No results for input(s): COLORU, APPEARANCEUA, PHUR, SPECGRAV, PROTEINUA, GLUCUA, KETONESU, BILIRUBINUA, OCCULTUA, NITRITE, UROBILINOGEN, LEUKOCYTESUR, RBCUA, WBCUA, BACTERIA, SQUAMEPITHEL, HYALINECASTS in the last 48 hours.    Invalid input(s): WRIGHTSUR    Significant Imaging: I have reviewed all pertinent imaging results/findings within the past 24 hours.    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  No acute hemorrhage         Primary hypertension  Continue home norvasc       CAD (coronary artery disease)  H/o cardiac stent   Continue aspirin and statin         VTE Risk Mitigation (From admission, onward)         Ordered     IP VTE HIGH RISK PATIENT  Once         07/25/23 1412     Place sequential compression device  Until discontinued         07/25/23 1412                     On 07/26/2023, patient should be placed in hospital observation services under my care in collaboration with Dr. Chavez.      Taylor Abarca PA-C  Department of Hospital Medicine  [place] [facility] ([facility])

## 2023-07-26 NOTE — SUBJECTIVE & OBJECTIVE
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     Current Outpatient Medications on File Prior to Encounter   Medication Sig    amLODIPine (NORVASC) 5 MG tablet Take 5 mg by mouth once daily.    aspirin (ECOTRIN) 81 MG EC tablet Take 81 mg by mouth once daily.    atorvastatin (LIPITOR) 20 MG tablet Take 1 tablet by mouth once daily.    calcium carbonate (TUMS) 200 mg calcium (500 mg) chewable tablet Take 500 mg by mouth once daily.    cholecalciferol, vitamin D3, (VITAMIN D3) 25 mcg (1,000 unit) capsule Take 1,000 Units by mouth.    linaCLOtide (LINZESS) 145 mcg Cap capsule Take 145 mcg by mouth.    MULTIVITAMIN ORAL Take 1 tablet by mouth once daily.    sodium chloride (OCEAN) 0.65 % nasal spray 1 spray by Nasal route daily as needed.    docusate sodium (COLACE) 100 MG capsule Take 100 mg by mouth 2 (two) times daily.    polyethylene glycol (GLYCOLAX) 17 gram/dose powder Take 17 g by mouth.    senna (SENOKOT) 8.6 mg tablet Take 8.6 mg by mouth.     Family History    None       Tobacco Use    Smoking status: Never    Smokeless tobacco: Never   Substance and Sexual Activity    Alcohol use: Not Currently    Drug use: Never    Sexual activity: Not Currently     Review of Systems   Constitutional:  Negative for chills and fever.   HENT:  Negative for ear discharge and ear pain.    Eyes:  Negative for pain and discharge.   Respiratory:  Negative for cough and shortness of breath.    Cardiovascular:  Negative for chest pain and leg swelling.   Gastrointestinal:  Negative for nausea and vomiting.   Endocrine: Negative for cold intolerance and heat intolerance.   Genitourinary:  Negative for difficulty " urinating and dysuria.   Musculoskeletal:  Positive for arthralgias. Negative for gait problem, joint swelling and myalgias.        Right hip pain    Skin:  Positive for color change. Negative for rash and wound.   Neurological:  Negative for dizziness and headaches.   Psychiatric/Behavioral:  Negative for agitation and confusion.    Objective:     Vital Signs (Most Recent):  Temp: 98.9 °F (37.2 °C) (07/26/23 0717)  Pulse: 73 (07/26/23 0717)  Resp: 18 (07/26/23 0717)  BP: (!) 158/65 (07/26/23 0717)  SpO2: 96 % (07/26/23 0717) Vital Signs (24h Range):  Temp:  [96.3 °F (35.7 °C)-99.2 °F (37.3 °C)] 98.9 °F (37.2 °C)  Pulse:  [59-73] 73  Resp:  [16-18] 18  SpO2:  [95 %-100 %] 96 %  BP: (136-178)/(63-77) 158/65     Weight: 50.9 kg (112 lb 3.4 oz)  Body mass index is 19.88 kg/m².     Physical Exam  Constitutional:       General: She is not in acute distress.  HENT:      Head: Normocephalic and atraumatic.   Eyes:      General:         Right eye: No discharge.         Left eye: No discharge.   Cardiovascular:      Rate and Rhythm: Normal rate and regular rhythm.   Pulmonary:      Effort: Pulmonary effort is normal.      Breath sounds: Normal breath sounds.   Abdominal:      General: There is no distension.      Tenderness: There is no abdominal tenderness.   Musculoskeletal:         General: No swelling or tenderness.      Cervical back: Neck supple. No tenderness.   Skin:     General: Skin is warm and dry.      Comments: Large round bruise on right glute    Neurological:      General: No focal deficit present.      Mental Status: She is alert and oriented to person, place, and time.   Psychiatric:         Mood and Affect: Mood normal.         Behavior: Behavior normal.              Significant Labs: A1C: No results for input(s): HGBA1C in the last 4320 hours.  ABGs: No results for input(s): PH, PCO2, HCO3, POCSATURATED, BE, TOTALHB, COHB, METHB, O2HB, POCFIO2, PO2 in the last 48 hours.  Bilirubin:   Recent Labs   Lab  07/25/23  1128   BILITOT 0.7     Blood Culture: No results for input(s): LABBLOO in the last 48 hours.  BMP:   Recent Labs   Lab 07/25/23  1128   GLU 97      K 3.7      CO2 32*   BUN 13   CREATININE 0.7   CALCIUM 10.8*     CBC:   Recent Labs   Lab 07/25/23  1128 07/26/23  0635   WBC 8.33 7.75   HGB 13.4 12.0   HCT 41.6 37.1    153     CMP:   Recent Labs   Lab 07/25/23  1128      K 3.7      CO2 32*   GLU 97   BUN 13   CREATININE 0.7   CALCIUM 10.8*   PROT 6.5   ALBUMIN 4.0   BILITOT 0.7   ALKPHOS 79   AST 18   ALT 21   ANIONGAP 7*     Cardiac Markers: No results for input(s): CKMB, MYOGLOBIN, BNP, TROPISTAT in the last 48 hours.  Coagulation: No results for input(s): PT, INR, APTT in the last 48 hours.  Lactic Acid: No results for input(s): LACTATE in the last 48 hours.  Lipase: No results for input(s): LIPASE in the last 48 hours.  Lipid Panel: No results for input(s): CHOL, HDL, LDLCALC, TRIG, CHOLHDL in the last 48 hours.  Magnesium: No results for input(s): MG in the last 48 hours.  POCT Glucose: No results for input(s): POCTGLUCOSE in the last 48 hours.  Prealbumin: No results for input(s): PREALBUMIN in the last 48 hours.  Respiratory Culture: No results for input(s): GSRESP, RESPIRATORYC in the last 48 hours.  Troponin: No results for input(s): TROPONINI, TROPONINIHS in the last 48 hours.  TSH: No results for input(s): TSH in the last 4320 hours.  Urine Culture: No results for input(s): LABURIN in the last 48 hours.  Urine Studies: No results for input(s): COLORU, APPEARANCEUA, PHUR, SPECGRAV, PROTEINUA, GLUCUA, KETONESU, BILIRUBINUA, OCCULTUA, NITRITE, UROBILINOGEN, LEUKOCYTESUR, RBCUA, WBCUA, BACTERIA, SQUAMEPITHEL, HYALINECASTS in the last 48 hours.    Invalid input(s): WRIGHTSUR    Significant Imaging: I have reviewed all pertinent imaging results/findings within the past 24 hours.

## 2023-07-26 NOTE — PLAN OF CARE
[facility] - Med Surg (3rd Fl)  Discharge Assessment    Primary Care Provider: Primary Doctor No     Discharge Assessment (most recent)       BRIEF DISCHARGE ASSESSMENT - 07/26/23 0942          Discharge Planning    Assessment Type Discharge Planning Brief Assessment     Resource/Environmental Concerns none     Support Systems Spouse/significant other;Children     Assistance Needed None     Equipment Currently Used at Home cane, straight;rollator;shower chair     Current Living Arrangements home     Patient/Family Anticipates Transition to home with family     Patient/Family Anticipated Services at Transition none     DME Needed Upon Discharge  walker, rolling     Discharge Plan A Home with family     Discharge Plan B Home with family                   Discharge assessment completed with patient. Patient resides in [place] with her spouse, son and daughter-in-law. Discussed potential DME needs. PT eval pending. SW to remain available.

## 2023-07-26 NOTE — CONSULTS
CM consulted for discharge planning and advanced directive assistance.       Discharge plan is home once medically cleared.    Patient declines any needs with advanced directives at this time.

## 2023-07-27 ENCOUNTER — TELEPHONE (OUTPATIENT)
Dept: FAMILY MEDICINE | Facility: CLINIC | Age: 88
End: 2023-07-27
Payer: MEDICARE

## 2023-07-27 VITALS
RESPIRATION RATE: 15 BRPM | TEMPERATURE: 98 F | SYSTOLIC BLOOD PRESSURE: 148 MMHG | WEIGHT: 112.19 LBS | BODY MASS INDEX: 19.88 KG/M2 | OXYGEN SATURATION: 98 % | DIASTOLIC BLOOD PRESSURE: 71 MMHG | HEART RATE: 69 BPM | HEIGHT: 63 IN

## 2023-07-27 DIAGNOSIS — T14.8XXA HEMATOMA: Primary | ICD-10-CM

## 2023-07-27 LAB
ALBUMIN SERPL BCP-MCNC: 3.5 G/DL (ref 3.5–5.2)
ALP SERPL-CCNC: 70 U/L (ref 55–135)
ALT SERPL W/O P-5'-P-CCNC: 14 U/L (ref 10–44)
ANION GAP SERPL CALC-SCNC: 8 MMOL/L (ref 8–16)
AST SERPL-CCNC: 15 U/L (ref 10–40)
BASOPHILS # BLD AUTO: 0.02 K/UL (ref 0–0.2)
BASOPHILS NFR BLD: 0.3 % (ref 0–1.9)
BILIRUB SERPL-MCNC: 0.5 MG/DL (ref 0.1–1)
BUN SERPL-MCNC: 15 MG/DL (ref 8–23)
CALCIUM SERPL-MCNC: 10.6 MG/DL (ref 8.7–10.5)
CHLORIDE SERPL-SCNC: 103 MMOL/L (ref 95–110)
CO2 SERPL-SCNC: 31 MMOL/L (ref 23–29)
CREAT SERPL-MCNC: 0.7 MG/DL (ref 0.5–1.4)
DIFFERENTIAL METHOD: ABNORMAL
EOSINOPHIL # BLD AUTO: 0.2 K/UL (ref 0–0.5)
EOSINOPHIL NFR BLD: 2.4 % (ref 0–8)
ERYTHROCYTE [DISTWIDTH] IN BLOOD BY AUTOMATED COUNT: 13.3 % (ref 11.5–14.5)
EST. GFR  (NO RACE VARIABLE): >60 ML/MIN/1.73 M^2
GLUCOSE SERPL-MCNC: 95 MG/DL (ref 70–110)
HCT VFR BLD AUTO: 36.1 % (ref 37–48.5)
HGB BLD-MCNC: 11.7 G/DL (ref 12–16)
IMM GRANULOCYTES # BLD AUTO: 0.02 K/UL (ref 0–0.04)
IMM GRANULOCYTES NFR BLD AUTO: 0.3 % (ref 0–0.5)
LYMPHOCYTES # BLD AUTO: 1.2 K/UL (ref 1–4.8)
LYMPHOCYTES NFR BLD: 15.7 % (ref 18–48)
MAGNESIUM SERPL-MCNC: 1.8 MG/DL (ref 1.6–2.6)
MCH RBC QN AUTO: 31.8 PG (ref 27–31)
MCHC RBC AUTO-ENTMCNC: 32.4 G/DL (ref 32–36)
MCV RBC AUTO: 98 FL (ref 82–98)
MONOCYTES # BLD AUTO: 0.5 K/UL (ref 0.3–1)
MONOCYTES NFR BLD: 6.8 % (ref 4–15)
NEUTROPHILS # BLD AUTO: 5.7 K/UL (ref 1.8–7.7)
NEUTROPHILS NFR BLD: 74.5 % (ref 38–73)
NRBC BLD-RTO: 0 /100 WBC
PLATELET # BLD AUTO: 161 K/UL (ref 150–450)
PMV BLD AUTO: 11.6 FL (ref 9.2–12.9)
POTASSIUM SERPL-SCNC: 3.6 MMOL/L (ref 3.5–5.1)
PROT SERPL-MCNC: 5.8 G/DL (ref 6–8.4)
RBC # BLD AUTO: 3.68 M/UL (ref 4–5.4)
SODIUM SERPL-SCNC: 142 MMOL/L (ref 136–145)
WBC # BLD AUTO: 7.6 K/UL (ref 3.9–12.7)

## 2023-07-27 PROCEDURE — 97530 THERAPEUTIC ACTIVITIES: CPT

## 2023-07-27 PROCEDURE — 83735 ASSAY OF MAGNESIUM: CPT | Performed by: PHYSICIAN ASSISTANT

## 2023-07-27 PROCEDURE — 80053 COMPREHEN METABOLIC PANEL: CPT | Performed by: PHYSICIAN ASSISTANT

## 2023-07-27 PROCEDURE — 94760 N-INVAS EAR/PLS OXIMETRY 1: CPT

## 2023-07-27 PROCEDURE — 99239 PR HOSPITAL DISCHARGE DAY,>30 MIN: ICD-10-PCS | Mod: ,,, | Performed by: FAMILY MEDICINE

## 2023-07-27 PROCEDURE — 36415 COLL VENOUS BLD VENIPUNCTURE: CPT | Performed by: PHYSICIAN ASSISTANT

## 2023-07-27 PROCEDURE — 97530 THERAPEUTIC ACTIVITIES: CPT | Mod: CO

## 2023-07-27 PROCEDURE — 99239 HOSP IP/OBS DSCHRG MGMT >30: CPT | Mod: ,,, | Performed by: FAMILY MEDICINE

## 2023-07-27 PROCEDURE — 25000003 PHARM REV CODE 250: Performed by: INTERNAL MEDICINE

## 2023-07-27 PROCEDURE — 85025 COMPLETE CBC W/AUTO DIFF WBC: CPT | Performed by: PHYSICIAN ASSISTANT

## 2023-07-27 PROCEDURE — 25000003 PHARM REV CODE 250: Performed by: PHYSICIAN ASSISTANT

## 2023-07-27 PROCEDURE — G0378 HOSPITAL OBSERVATION PER HR: HCPCS

## 2023-07-27 PROCEDURE — 97535 SELF CARE MNGMENT TRAINING: CPT | Mod: CO

## 2023-07-27 RX ADMIN — AMLODIPINE BESYLATE 5 MG: 5 TABLET ORAL at 08:07

## 2023-07-27 RX ADMIN — ASPIRIN 81 MG: 81 TABLET, COATED ORAL at 08:07

## 2023-07-27 RX ADMIN — POLYETHYLENE GLYCOL 3350 17 G: 17 POWDER, FOR SOLUTION ORAL at 08:07

## 2023-07-27 RX ADMIN — DOCUSATE SODIUM 100 MG: 100 CAPSULE, LIQUID FILLED ORAL at 08:07

## 2023-07-27 NOTE — SUBJECTIVE & OBJECTIVE
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     Current Outpatient Medications on File Prior to Encounter   Medication Sig    amLODIPine (NORVASC) 5 MG tablet Take 5 mg by mouth once daily.    aspirin (ECOTRIN) 81 MG EC tablet Take 81 mg by mouth once daily.    atorvastatin (LIPITOR) 20 MG tablet Take 1 tablet by mouth once daily.    calcium carbonate (TUMS) 200 mg calcium (500 mg) chewable tablet Take 500 mg by mouth once daily.    cholecalciferol, vitamin D3, (VITAMIN D3) 25 mcg (1,000 unit) capsule Take 1,000 Units by mouth.    linaCLOtide (LINZESS) 145 mcg Cap capsule Take 145 mcg by mouth.    MULTIVITAMIN ORAL Take 1 tablet by mouth once daily.    sodium chloride (OCEAN) 0.65 % nasal spray 1 spray by Nasal route daily as needed.    docusate sodium (COLACE) 100 MG capsule Take 100 mg by mouth 2 (two) times daily.    polyethylene glycol (GLYCOLAX) 17 gram/dose powder Take 17 g by mouth.    senna (SENOKOT) 8.6 mg tablet Take 8.6 mg by mouth.     Family History    None       Tobacco Use    Smoking status: Never    Smokeless tobacco: Never   Substance and Sexual Activity    Alcohol use: Not Currently    Drug use: Never    Sexual activity: Not Currently     Review of Systems   Constitutional:  Negative for chills and fever.   HENT:  Negative for ear discharge and ear pain.    Eyes:  Negative for pain and discharge.   Respiratory:  Negative for cough and shortness of breath.    Cardiovascular:  Negative for chest pain and leg swelling.   Gastrointestinal:  Negative for nausea and vomiting.   Endocrine: Negative for cold intolerance and heat intolerance.   Genitourinary:  Negative for difficulty " urinating and dysuria.   Musculoskeletal:  Positive for arthralgias. Negative for gait problem, joint swelling and myalgias.        Right hip pain    Skin:  Positive for color change. Negative for rash and wound.   Neurological:  Negative for dizziness and headaches.   Psychiatric/Behavioral:  Negative for agitation and confusion.    Objective:     Vital Signs (Most Recent):  Temp: 98.5 °F (36.9 °C) (07/27/23 0701)  Pulse: 69 (07/27/23 0701)  Resp: 17 (07/27/23 0701)  BP: (!) 161/73 (07/27/23 0701)  SpO2: 95 % (07/27/23 0716) Vital Signs (24h Range):  Temp:  [97.7 °F (36.5 °C)-99.5 °F (37.5 °C)] 98.5 °F (36.9 °C)  Pulse:  [67-81] 69  Resp:  [16-18] 17  SpO2:  [94 %-97 %] 95 %  BP: (131-164)/(60-74) 161/73     Weight: 50.9 kg (112 lb 3.4 oz)  Body mass index is 19.88 kg/m².     Physical Exam  Constitutional:       General: She is not in acute distress.  HENT:      Head: Normocephalic and atraumatic.   Eyes:      General:         Right eye: No discharge.         Left eye: No discharge.   Cardiovascular:      Rate and Rhythm: Normal rate and regular rhythm.   Pulmonary:      Effort: Pulmonary effort is normal.      Breath sounds: Normal breath sounds.   Abdominal:      General: There is no distension.      Tenderness: There is no abdominal tenderness.   Musculoskeletal:         General: No swelling or tenderness.      Cervical back: Neck supple. No tenderness.   Skin:     General: Skin is warm and dry.      Comments: Large round bruise on right glute (stable)    Neurological:      General: No focal deficit present.      Mental Status: She is alert and oriented to person, place, and time.      Comments: No ataxia UE/LE  4/5 strength throughout all extremities    Psychiatric:         Mood and Affect: Mood normal.         Behavior: Behavior normal.              Significant Labs: A1C: No results for input(s): HGBA1C in the last 4320 hours.  ABGs: No results for input(s): PH, PCO2, HCO3, POCSATURATED, BE, TOTALHB, COHB,  METHB, O2HB, POCFIO2, PO2 in the last 48 hours.  Bilirubin:   Recent Labs   Lab 07/25/23  1128 07/27/23  0610   BILITOT 0.7 0.5       Blood Culture: No results for input(s): LABBLOO in the last 48 hours.  BMP:   Recent Labs   Lab 07/27/23  0610   GLU 95      K 3.6      CO2 31*   BUN 15   CREATININE 0.7   CALCIUM 10.6*   MG 1.8       CBC:   Recent Labs   Lab 07/25/23  1128 07/26/23  0635 07/27/23  0610   WBC 8.33 7.75 7.60   HGB 13.4 12.0 11.7*   HCT 41.6 37.1 36.1*    153 161       CMP:   Recent Labs   Lab 07/25/23  1128 07/27/23  0610    142   K 3.7 3.6    103   CO2 32* 31*   GLU 97 95   BUN 13 15   CREATININE 0.7 0.7   CALCIUM 10.8* 10.6*   PROT 6.5 5.8*   ALBUMIN 4.0 3.5   BILITOT 0.7 0.5   ALKPHOS 79 70   AST 18 15   ALT 21 14   ANIONGAP 7* 8       Cardiac Markers: No results for input(s): CKMB, MYOGLOBIN, BNP, TROPISTAT in the last 48 hours.  Coagulation: No results for input(s): PT, INR, APTT in the last 48 hours.  Lactic Acid: No results for input(s): LACTATE in the last 48 hours.  Lipase: No results for input(s): LIPASE in the last 48 hours.  Lipid Panel: No results for input(s): CHOL, HDL, LDLCALC, TRIG, CHOLHDL in the last 48 hours.  Magnesium:   Recent Labs   Lab 07/27/23  0610   MG 1.8     POCT Glucose: No results for input(s): POCTGLUCOSE in the last 48 hours.  Prealbumin: No results for input(s): PREALBUMIN in the last 48 hours.  Respiratory Culture: No results for input(s): GSRESP, RESPIRATORYC in the last 48 hours.  Troponin: No results for input(s): TROPONINI, TROPONINIHS in the last 48 hours.  TSH: No results for input(s): TSH in the last 4320 hours.  Urine Culture: No results for input(s): LABURIN in the last 48 hours.  Urine Studies: No results for input(s): COLORU, APPEARANCEUA, PHUR, SPECGRAV, PROTEINUA, GLUCUA, KETONESU, BILIRUBINUA, OCCULTUA, NITRITE, UROBILINOGEN, LEUKOCYTESUR, RBCUA, WBCUA, BACTERIA, SQUAMEPITHEL, HYALINECASTS in the last 48 hours.    Invalid  input(s): FATMATA    Significant Imaging: I have reviewed all pertinent imaging results/findings within the past 24 hours.

## 2023-07-27 NOTE — PT/OT/SLP PROGRESS
"Physical Therapy Treatment    Patient Name:  Ashley Mak   MRN:  [id number]    Recommendations:     Discharge Recommendations: home with home health, home health PT, home health OT  Discharge Equipment Recommendations: walker, rolling, bedside commode  Barriers to discharge: None    Assessment:     Ashley Mak is a 87 y.o. female admitted with a medical diagnosis of Traumatic hematoma of buttock.  She presents with the following impairments/functional limitations: weakness, impaired endurance, impaired self care skills, impaired functional mobility, gait instability, impaired balance, decreased safety awareness. Patient tolerated inc gait distance using RW x ~80 feet with contact guard assistance. No sign of distress and fatigue.    Rehab Prognosis: Fair; patient would benefit from acute skilled PT services to address these deficits and reach maximum level of function.    Recent Surgery: * No surgery found *      Plan:     During this hospitalization, patient to be seen 5 x/week to address the identified rehab impairments via gait training, therapeutic activities, therapeutic exercises and progress toward the following goals:    Plan of Care Expires:  08/02/23    Subjective     Chief Complaint: no new complain  Patient/Family Comments/goals: "to return home with daughter".  Pain/Comfort:  Pain Rating 1: 0/10      Objective:     Communicated with patient and daughter prior to session.  Patient found HOB elevated with peripheral IV, PureWick upon PT entry to room.     General Precautions: Standard, fall  Orthopedic Precautions: N/A  Braces: N/A  Respiratory Status: Room air     Functional Mobility:  Bed Mobility:     Rolling Left:  supervision  Rolling Right: supervision  Scooting: contact guard assistance  Supine to Sit: contact guard assistance  Sit to Supine: contact guard assistance  Transfers:     Sit to Stand:  contact guard assistance with rolling walker  Gait: Contact Guard Assistance x ~80 feet with RW . " Decrease stride length and dec foot/floor clearance. Tried pt using pt's personal st cane and noted unsteady and more decrease stride length due to lack of self confidence.      AM-PAC 6 CLICK MOBILITY  Turning over in bed (including adjusting bedclothes, sheets and blankets)?: 3  Sitting down on and standing up from a chair with arms (e.g., wheelchair, bedside commode, etc.): 3  Moving from lying on back to sitting on the side of the bed?: 3  Moving to and from a bed to a chair (including a wheelchair)?: 3  Need to walk in hospital room?: 3  Climbing 3-5 steps with a railing?: 3  Basic Mobility Total Score: 18       Treatment & Education:  Provided AAROM - AROM ex on B LE's x 10 reps on each, rolling to sides x 5 and gait trng x ~80 feet with contact guard assistance using RW    Patient left HOB elevated with all lines intact, call button in reach, bed alarm on, nursing  notified, and daughter present..    GOALS:   Multidisciplinary Problems       Physical Therapy Goals          Problem: Physical Therapy    Goal Priority Disciplines Outcome Goal Variances Interventions   Physical Therapy Goal     PT, PT/OT Ongoing, Progressing     Description: Patient will increase functional independence with mobility by performin. Supine to sit with Modified [place]  2. Sit to supine with Modified [place]  3. Rolling to Left and Right with Modified [place].  4. Sit to stand transfer with Supervision  5. Bed to chair transfer with Supervision using Rolling Walker  6. Gait  x 100 feet with Stand-by Assistance using Rolling Walker.   7. Lower extremity exercise program x10 reps per handout, with independence                         Time Tracking:     PT Received On: 23  PT Start Time: 0845     PT Stop Time: 0900  PT Total Time (min): 15 min     Billable Minutes: Therapeutic Activity 15    Treatment Type: Treatment  PT/PTA: PT           2023

## 2023-07-27 NOTE — ASSESSMENT & PLAN NOTE
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  No acute hemorrhage     7/27 HH stable.  Plan to discharge today.

## 2023-07-27 NOTE — PROGRESS NOTES
"[facility] ([address] [place] [address] Medicine  Progress Note    Patient Name: Ashley Mak  MRN: [id number]  Patient Class: OP- Observation   Admission Date: 7/25/2023  Length of Stay: 0 days  Attending Physician: Ely Moore MD  Primary Care Provider: Primary Doctor No        Subjective:     Principal Problem:Traumatic hematoma of buttock        HPI:  Patient is a 87 year old female with medical history of HTN, known brain mass and CAD with stent who presented to the ED after fall.  She slipped on a wet floor causing her to fall.  She hit the back of her head and right hip.  She has chronic neck pain.  After falling she was able to walk but is having right hip pain.  She takes aspirin for her CAD but not on anticoagulation.      Admitted for large right gluteal hematoma.  Monitoring h/h.                  Overview/Hospital Course:  PT HD stable on room air.  BP slightly on higher side but trending down.  H/h stable.  Plan to discharge today home with rolling walker and beside commode.        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     Current Outpatient Medications on File Prior to Encounter   Medication Sig    amLODIPine (NORVASC) 5 MG tablet Take 5 mg by mouth once daily.    aspirin (ECOTRIN) 81 MG EC tablet Take 81 mg by mouth once daily.    atorvastatin (LIPITOR) 20 MG tablet Take 1 tablet by mouth once daily.    calcium carbonate (TUMS) 200 mg calcium (500 mg) chewable tablet Take 500 mg by mouth once daily.    cholecalciferol, vitamin D3, (VITAMIN D3) 25 mcg (1,000 unit) capsule Take 1,000 Units by mouth.    linaCLOtide (LINZESS) 145 mcg Cap capsule Take 145 mcg by " mouth.    MULTIVITAMIN ORAL Take 1 tablet by mouth once daily.    sodium chloride (OCEAN) 0.65 % nasal spray 1 spray by Nasal route daily as needed.    docusate sodium (COLACE) 100 MG capsule Take 100 mg by mouth 2 (two) times daily.    polyethylene glycol (GLYCOLAX) 17 gram/dose powder Take 17 g by mouth.    senna (SENOKOT) 8.6 mg tablet Take 8.6 mg by mouth.     Family History    None       Tobacco Use    Smoking status: Never    Smokeless tobacco: Never   Substance and Sexual Activity    Alcohol use: Not Currently    Drug use: Never    Sexual activity: Not Currently     Review of Systems   Constitutional:  Negative for chills and fever.   HENT:  Negative for ear discharge and ear pain.    Eyes:  Negative for pain and discharge.   Respiratory:  Negative for cough and shortness of breath.    Cardiovascular:  Negative for chest pain and leg swelling.   Gastrointestinal:  Negative for nausea and vomiting.   Endocrine: Negative for cold intolerance and heat intolerance.   Genitourinary:  Negative for difficulty urinating and dysuria.   Musculoskeletal:  Positive for arthralgias. Negative for gait problem, joint swelling and myalgias.        Right hip pain    Skin:  Positive for color change. Negative for rash and wound.   Neurological:  Negative for dizziness and headaches.   Psychiatric/Behavioral:  Negative for agitation and confusion.    Objective:     Vital Signs (Most Recent):  Temp: 98.5 °F (36.9 °C) (07/27/23 0701)  Pulse: 69 (07/27/23 0701)  Resp: 17 (07/27/23 0701)  BP: (!) 161/73 (07/27/23 0701)  SpO2: 95 % (07/27/23 0716) Vital Signs (24h Range):  Temp:  [97.7 °F (36.5 °C)-99.5 °F (37.5 °C)] 98.5 °F (36.9 °C)  Pulse:  [67-81] 69  Resp:  [16-18] 17  SpO2:  [94 %-97 %] 95 %  BP: (131-164)/(60-74) 161/73     Weight: 50.9 kg (112 lb 3.4 oz)  Body mass index is 19.88 kg/m².     Physical Exam  Constitutional:       General: She is not in acute distress.  HENT:      Head: Normocephalic and atraumatic.    Eyes:      General:         Right eye: No discharge.         Left eye: No discharge.   Cardiovascular:      Rate and Rhythm: Normal rate and regular rhythm.   Pulmonary:      Effort: Pulmonary effort is normal.      Breath sounds: Normal breath sounds.   Abdominal:      General: There is no distension.      Tenderness: There is no abdominal tenderness.   Musculoskeletal:         General: No swelling or tenderness.      Cervical back: Neck supple. No tenderness.   Skin:     General: Skin is warm and dry.      Comments: Large round bruise on right glute (stable)    Neurological:      General: No focal deficit present.      Mental Status: She is alert and oriented to person, place, and time.      Comments: No ataxia UE/LE  4/5 strength throughout all extremities    Psychiatric:         Mood and Affect: Mood normal.         Behavior: Behavior normal.              Significant Labs: A1C: No results for input(s): HGBA1C in the last 4320 hours.  ABGs: No results for input(s): PH, PCO2, HCO3, POCSATURATED, BE, TOTALHB, COHB, METHB, O2HB, POCFIO2, PO2 in the last 48 hours.  Bilirubin:   Recent Labs   Lab 07/25/23  1128 07/27/23  0610   BILITOT 0.7 0.5       Blood Culture: No results for input(s): LABBLOO in the last 48 hours.  BMP:   Recent Labs   Lab 07/27/23  0610   GLU 95      K 3.6      CO2 31*   BUN 15   CREATININE 0.7   CALCIUM 10.6*   MG 1.8       CBC:   Recent Labs   Lab 07/25/23  1128 07/26/23  0635 07/27/23  0610   WBC 8.33 7.75 7.60   HGB 13.4 12.0 11.7*   HCT 41.6 37.1 36.1*    153 161       CMP:   Recent Labs   Lab 07/25/23  1128 07/27/23  0610    142   K 3.7 3.6    103   CO2 32* 31*   GLU 97 95   BUN 13 15   CREATININE 0.7 0.7   CALCIUM 10.8* 10.6*   PROT 6.5 5.8*   ALBUMIN 4.0 3.5   BILITOT 0.7 0.5   ALKPHOS 79 70   AST 18 15   ALT 21 14   ANIONGAP 7* 8       Cardiac Markers: No results for input(s): CKMB, MYOGLOBIN, BNP, TROPISTAT in the last 48 hours.  Coagulation: No results  for input(s): PT, INR, APTT in the last 48 hours.  Lactic Acid: No results for input(s): LACTATE in the last 48 hours.  Lipase: No results for input(s): LIPASE in the last 48 hours.  Lipid Panel: No results for input(s): CHOL, HDL, LDLCALC, TRIG, CHOLHDL in the last 48 hours.  Magnesium:   Recent Labs   Lab 07/27/23  0610   MG 1.8     POCT Glucose: No results for input(s): POCTGLUCOSE in the last 48 hours.  Prealbumin: No results for input(s): PREALBUMIN in the last 48 hours.  Respiratory Culture: No results for input(s): GSRESP, RESPIRATORYC in the last 48 hours.  Troponin: No results for input(s): TROPONINI, TROPONINIHS in the last 48 hours.  TSH: No results for input(s): TSH in the last 4320 hours.  Urine Culture: No results for input(s): LABURIN in the last 48 hours.  Urine Studies: No results for input(s): COLORU, APPEARANCEUA, PHUR, SPECGRAV, PROTEINUA, GLUCUA, KETONESU, BILIRUBINUA, OCCULTUA, NITRITE, UROBILINOGEN, LEUKOCYTESUR, RBCUA, WBCUA, BACTERIA, SQUAMEPITHEL, HYALINECASTS in the last 48 hours.    Invalid input(s): WRIGHTSUR    Significant Imaging: I have reviewed all pertinent imaging results/findings within the past 24 hours.      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  No acute hemorrhage     7/27 HH stable.  Plan to discharge today.          Primary hypertension  Continue home norvasc       CAD (coronary artery disease)  H/o cardiac stent   Continue aspirin and statin         VTE Risk Mitigation (From admission, onward)         Ordered     IP VTE HIGH RISK PATIENT  Once         07/25/23 1412     Place sequential compression device  Until discontinued         07/25/23 1412                Discharge Planning   ALYSE:      Code Status: Full Code   Is the patient  medically ready for discharge?:     Reason for patient still in hospital (select all that apply): Other (specify) discharge today  Discharge Plan A: Home with family                  Taylor Abarca PA-C  Department of Hospital Medicine   [facility]

## 2023-07-27 NOTE — PLAN OF CARE
07/27/23 0921   Post-Acute Status   Post-Acute Authorization HME   HME Status Pending therapy documentation   Discharge Plan   Discharge Plan A Home with family   Discharge Plan B Home Health         LATRICIA Bower notified CM of discharge recommendations for home health, rolling walker and bedside commode.   Patient lives in [place], therefore home health can not be set up here, unless she is staying.   CM confirmed with [facility] that discharging MD, Dr. Burns, would need to agree to sign HH order and agree to sign HH order for the remainder of the HH course, and also agree to have patient follow up in clinic. CM notified ZACK Vazquez of this.   CM waiting on therapy documentation and rolling walker, bedside commode orders.   Once orders placed, CM will ask to pull equipment from [employer].     0938:   Orders for rolling walker and bedside commode placed. CM sent secure chat to MORTEZA Carias, to review and give permission to pull from [employer]. Awaiting response.

## 2023-07-27 NOTE — PLAN OF CARE
[facility] - Med Surg (3rd Fl)  Discharge Final Note    Primary Care Provider: Primary Doctor No    Expected Discharge Date: 7/27/2023    Final Discharge Note (most recent)       Final Note - 07/27/23 1357          Final Note    Assessment Type Final Discharge Note (P)      Anticipated Discharge Disposition Home-Health Care Svc (P)      What phone number can be called within the next 1-3 days to see how you are doing after discharge? [phone] (P)      Hospital Resources/Appts/Education Provided Appointments scheduled and added to AVS (P)         Post-Acute Status    Post-Acute Authorization Home Health;HME (P)      HME Status Set-up Complete/Auth obtained (P)      Home Health Status Set-up Complete/Auth obtained (P)      Patient choice form signed by patient/caregiver List from System Post-Acute Care (P)      Discharge Delays None known at this time (P)                                 Contact Info       Sabra Burns MD   Specialty: Family Medicine    [address] [place] [id number]   Phone: [phone]       Next Steps: Go on 8/1/2023    Instructions: at 1:00 PM            Patient will discharge to daughter's home today with [facility] services and bedside commode from [facility]. Hospital follow up made with Dr. Burns. No other discharge needs.

## 2023-07-27 NOTE — HOSPITAL COURSE
PT HD stable on room air.  BP slightly on higher side but trending down.  H/h stable.  Plan to discharge today home with home health, rolling walker and beside commode.

## 2023-07-27 NOTE — TELEPHONE ENCOUNTER
Patient to have cbc and cmp obtained on Monday via h/h and faxed to me prior to our visit/hospital follow up on Tuesday.  MH

## 2023-07-27 NOTE — PLAN OF CARE
Problem: Occupational Therapy  Goal: Occupational Therapy Goal  Description: Pt to perform UB dressing with MOD I and seated EOB.  Pt to perform LB dressing with MOD I seated EOB.   Pt to perform level functional transfers required for ADL's with MOD I, RW level.  Pt to demonstrate Good dynamic standing balance as required to perform ADL's from standing level.  Pt to complete standing ADL task for >5 minutes with no LOB occurrences for increased safety and independence upon discharge.   Outcome: Ongoing, Progressing   Cont with OT POC

## 2023-07-27 NOTE — PLAN OF CARE
Problem: Pain Acute  Goal: Acceptable Pain Control and Functional Ability  Outcome: Ongoing, Progressing     Problem: Coordination Impairment (Functional Deficit)  Goal: Optimal Coordination  Outcome: Ongoing, Progressing     Problem: Fall Injury Risk  Goal: Absence of Fall and Fall-Related Injury  Outcome: Ongoing, Progressing

## 2023-07-27 NOTE — PT/OT/SLP PROGRESS
Occupational Therapy   Treatment    Name: Ashley Mak  MRN: [id number]  Admitting Diagnosis:  Traumatic hematoma of buttock       Recommendations:     Discharge Recommendations: home with home health, home health PT, home health OT  Discharge Equipment Recommendations:  none  Barriers to discharge:  Other (Comment) (Increased weakness)    Assessment:     Ashley Mak is a 87 y.o. female with a medical diagnosis of Traumatic hematoma of buttock.  Performance deficits affecting function are weakness, impaired endurance, impaired self care skills, impaired functional mobility, gait instability, impaired balance, decreased lower extremity function, decreased safety awareness.     Rehab Prognosis:  Good; patient would benefit from acute skilled OT services to address these deficits and reach maximum level of function.       Plan:     Patient to be seen 5 x/week to address the above listed problems via self-care/home management, therapeutic activities, therapeutic exercises  Plan of Care Expires: 08/09/23  Plan of Care Reviewed with: family    Subjective     Chief Complaint: none  Patient/Family Comments/goals: none  Pain/Comfort:  Pain Rating 1: 0/10    Objective:     Communicated with: RN prior to session.  Patient found HOB elevated with peripheral IV, PureWick upon OT entry to room.    General Precautions: Standard, fall    Orthopedic Precautions:N/A  Braces: N/A  Respiratory Status: Room air     Occupational Performance:     Bed Mobility:    Patient completed Rolling/Turning to Right with modified independence  Patient completed Scooting/Bridging with supervision  Patient completed Supine to Sit with contact guard assistance     Functional Mobility/Transfers:  Patient completed Sit <> Stand Transfer with contact guard assistance  with  hand-held assist   Patient completed Bed <> Chair Transfer using Step Transfer technique with contact guard assistance with hand-held assist  Functional Mobility: Pt required CGA with  transfers.    Activities of Daily Living:  Grooming: supervision for brushing teeth while in standing at bedside table.  LB dressing: CGA      [facility] 6 Click ADL: 21    Treatment & Education:  Pt rolled to R side with Mod I and up to sitting on EOB with CGA. Pt was able to scoot to EOB with CGA and max verbal cues/tactile cue to maintain sitting balance. Pt scooted to EOB with CGA to maintain sitting balance and to maintain midline. Once patient was in sitting she had difficulty maintaining sitting balance leaning backwards, to L side and forwards. Pt was able to take socks off with CGA and adeel them with CGA but more difficulty due to difficulty maintaining sitting balance. Pt stood from EOB with CGA to bedside table where she was able to perform oral hygiene with supervision and CGA while in standing. Pt ambulated over to bedside chair with CGA/hand held assistance. Pt's daughter was encouraged to make sure someone is with patient if she is sitting unsupported due to moderated sitting balance deficits. Pt was unable to tell when she was leaning and required verbal and tactile cues to correct to midline which makes patient at risk for falls.    Patient left up in chair with all lines intact, call button in reach, RN notified, daughter present, and [employer] present    GOALS:   Multidisciplinary Problems       Occupational Therapy Goals          Problem: Occupational Therapy    Goal Priority Disciplines Outcome Interventions   Occupational Therapy Goal     OT, PT/OT     Description: Pt to perform UB dressing with MOD I and seated EOB.  Pt to perform LB dressing with MOD I seated EOB.   Pt to perform level functional transfers required for ADL's with MOD I, RW level.  Pt to demonstrate Good dynamic standing balance as required to perform ADL's from standing level.  Pt to complete standing ADL task for >5 minutes with no LOB occurrences for increased safety and independence upon discharge.                        Time  Tracking:     OT Date of Treatment: 07/27/23  OT Start Time: 0934  OT Stop Time: 1003  OT Total Time (min): 29 min    Billable Minutes:Self Care/Home Management 20  Therapeutic Activity 9    OT/CARLOS: CARLOS     Number of CARLOS visits since last OT visit: 1 7/27/2023

## 2023-07-27 NOTE — DISCHARGE SUMMARY
[facility] Surg ([facility])  [facility] Medicine  Discharge Summary      Patient Name: Ashley Mak  MRN: [id number]  [facility]: [id number]  Patient Class: OP- Observation  Admission Date: 7/25/2023  Hospital Length of Stay: 0 days  Discharge Date and Time:  07/27/2023 4:26 PM  Attending Physician: No att. providers found   Discharging Provider: Sabra Burns MD  Primary Care Provider: Primary Doctor No    Primary Care Team: Networked reference to record PCT     HPI:   Patient is a 87 year old female with medical history of HTN, known brain mass and CAD with stent who presented to the ED after fall.  She slipped on a wet floor causing her to fall.  She hit the back of her head and right hip.  She has chronic neck pain.  After falling she was able to walk but is having right hip pain.  She takes aspirin for her CAD but not on anticoagulation.      Admitted for large right gluteal hematoma.  Monitoring h/h.                  * No surgery found *      Hospital Course:   PT HD stable on room air.  BP slightly on higher side but trending down.  H/h stable.  Plan to discharge today home with home health, rolling walker and beside commode.         Goals of Care Treatment Preferences:  Code Status: Full Code      Consults:   Consults (From admission, onward)        Status Ordering Provider     IP consult to case management  Once        Provider:  (Not yet assigned)    Completed RYDER ORELLANA          Cardiac/Vascular  Primary hypertension  Continue home norvasc       CAD (coronary artery disease)  H/o cardiac stent   Continue aspirin and statin       Orthopedic  * Traumatic hematoma of buttock  Large right gluteal hematoma  H/h 13.4 at admission 12.0   Will trend h/h       CT pelvis: soft tissue right gluteal hematoma  Femur xray: soft tissue induration  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  No acute hemorrhage     7/27 HH stable.  Plan to discharge today.    Will recheck cbc on Monday prior to  "her f/u with me.        Final Active Diagnoses:    Diagnosis Date Noted POA    PRINCIPAL PROBLEM:  Traumatic hematoma of buttock [S30.0XXA] 07/26/2023 Yes    CAD (coronary artery disease) [I25.10] 07/26/2023 Yes    Primary hypertension [I10] 07/26/2023 Yes      Problems Resolved During this Admission:       Discharged Condition: good    Disposition: Home-Health [facility]    Follow Up:   Follow-up Information     Sabra Burns MD. Go on 8/1/2023.    Specialty: Family Medicine  Why: at 1:00 PM  Contact information:  [address]  [place] [address]  [phone]                       Patient Instructions:      COMMODE FOR HOME USE     Order Specific Question Answer Comments   Type: Standard    Height: 5' 3" (1.6 m)    Weight: 50.9 kg (112 lb 3.4 oz)    Does patient have medical equipment at home? rollator    Length of need (1-99 months): 99      WALKER FOR HOME USE     Order Specific Question Answer Comments   Type of Walker: Adult (5'4"-6'6")    With wheels? Yes    Height: 5' 3" (1.6 m)    Weight: 50.9 kg (112 lb 3.4 oz)    Length of need (1-99 months): 99    Does patient have medical equipment at home? rollator    Please check all that apply: Patient is unable to safely ambulate without equipment.      Ambulatory referral/consult to Home Health   Standing Status: Future   Referral Priority: Routine Referral Type: Home Health   Referral Reason: Specialty Services Required   Requested Specialty: Home Health Services   Number of Visits Requested: 1       Significant Diagnostic Studies: Labs:   BMP:   Recent Labs   Lab 07/27/23  0610   GLU 95      K 3.6      CO2 31*   BUN 15   CREATININE 0.7   CALCIUM 10.6*   MG 1.8    and CBC   Recent Labs   Lab 07/26/23  0635 07/27/23  0610   WBC 7.75 7.60   HGB 12.0 11.7*   HCT 37.1 36.1*    161       Pending Diagnostic Studies:     None         Medications:  Reconciled Home Medications:      Medication List      CONTINUE taking these medications  "   amLODIPine 5 MG tablet  Commonly known as: NORVASC  Take 5 mg by mouth once daily.     aspirin 81 MG EC tablet  Commonly known as: ECOTRIN  Take 81 mg by mouth once daily.     atorvastatin 20 MG tablet  Commonly known as: LIPITOR  Take 1 tablet by mouth once daily.     calcium carbonate 200 mg calcium (500 mg) chewable tablet  Commonly known as: TUMS  Take 500 mg by mouth once daily.     cholecalciferol (vitamin D3) 25 mcg (1,000 unit) capsule  Commonly known as: VITAMIN D3  Take 1,000 Units by mouth.     docusate sodium 100 MG capsule  Commonly known as: COLACE  Take 100 mg by mouth 2 (two) times daily.     linaCLOtide 145 mcg Cap capsule  Commonly known as: LINZESS  Take 145 mcg by mouth.     MULTIVITAMIN ORAL  Take 1 tablet by mouth once daily.     polyethylene glycol 17 gram/dose powder  Commonly known as: GLYCOLAX  Take 17 g by mouth.     sodium chloride 0.65 % nasal spray  Commonly known as: OCEAN  1 spray by Nasal route daily as needed.        STOP taking these medications    senna 8.6 mg tablet  Commonly known as: SENOKOT            Indwelling Lines/Drains at time of discharge:   Lines/Drains/Airways     Drain  Duration           Female External Urinary Catheter 07/27/23 0701 <1 day                Time spent on the discharge of patient: 35 minutes     Patient is appropriate for home health and requires these services for monitoring of her h/h and continued physical therapy after fall and significant soft tissue injury in order for her to regain strength and increase safety with gait thereby minimizing chance of recurring injury.    Sabra Burns MD  Department of Hospital Medicine  [place] [facility] Surg (3rd Fl)

## 2023-07-27 NOTE — PLAN OF CARE
07/27/23 1353   Post-Acute Status   Post-Acute Authorization HME;Home Health   HME Status Set-up Complete/Auth obtained   Home Health Status Set-up Complete/Auth obtained   Patient choice form signed by patient/caregiver List from System Post-Acute Care   Discharge Delays None known at this time   Discharge Plan   Discharge Plan A Home Health   Discharge Plan B Home Health         Daughter now requesting bedside commode. Daughter on phone with MORTEZA Carias, upon CM entering room. Yuri approves pulling bedside commode from depot, and CM witnessed payment via phone.   Dr. Burns agrees to sign HH order, recurrent orders, and to see patient in clinic.   [facility] has accepted patient. Physical address provided to [facility].

## 2023-07-27 NOTE — PLAN OF CARE
07/27/23 1004   Post-Acute Status   Post-Acute Authorization HME   HME Status Patient declined/refused         Daughter states she has rolling walker and bedside commode for patient already. Therefore CM does not need to obtain rolling walker and bedside commode.

## 2023-07-27 NOTE — PLAN OF CARE
Problem: Adult Inpatient Plan of Care  Goal: Plan of Care Review  Outcome: Adequate for Care Transition  Flowsheets (Taken 7/27/2023 1908)  Plan of Care Reviewed With: patient     Problem: Fall Injury Risk  Goal: Absence of Fall and Fall-Related Injury  Outcome: Adequate for Care Transition     Problem: Skin Injury Risk Increased  Goal: Skin Health and Integrity  Outcome: Adequate for Care Transition     Problem: Pain Acute  Goal: Acceptable Pain Control and Functional Ability  Outcome: Adequate for Care Transition     Problem: Balance Impairment (Functional Deficit)  Goal: Improved Balance and Postural Control  Outcome: Adequate for Care Transition     Problem: Coordination Impairment (Functional Deficit)  Goal: Optimal Coordination  Outcome: Adequate for Care Transition

## 2023-07-27 NOTE — ASSESSMENT & PLAN NOTE
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  No acute hemorrhage     7/27 HH stable.  Plan to discharge today.    Will recheck cbc on Monday prior to her f/u with me.

## 2023-07-28 NOTE — PT/OT/SLP DISCHARGE
Physical Therapy Discharge Summary    Name: Ashley Mak  MRN: [id number]   Principal Problem: Traumatic hematoma of buttock     Patient Discharged from acute Physical Therapy on 23.  Please refer to prior PT noted date on 23 for functional status.     Assessment:     Patient appropriate for care in another setting.    Objective:     GOALS:   Multidisciplinary Problems       Physical Therapy Goals          Problem: Physical Therapy    Goal Priority Disciplines Outcome Goal Variances Interventions   Physical Therapy Goal     PT, PT/OT Ongoing, Progressing     Description: Patient will increase functional independence with mobility by performin. Supine to sit with Modified [place]  2. Sit to supine with Modified [place]  3. Rolling to Left and Right with Modified [place].  4. Sit to stand transfer with Supervision  5. Bed to chair transfer with Supervision using Rolling Walker  6. Gait  x 100 feet with Stand-by Assistance using Rolling Walker.   7. Lower extremity exercise program x10 reps per handout, with independence                         Reasons for Discontinuation of Therapy Services  Transfer to alternate level of care.      Plan:     Patient Discharged to: Home with Home Health Service.      2023

## 2023-07-29 ENCOUNTER — HOSPITAL ENCOUNTER (EMERGENCY)
Facility: HOSPITAL | Age: 88
Discharge: HOME | End: 2023-07-29
Attending: SURGERY
Payer: MEDICARE

## 2023-07-29 VITALS
TEMPERATURE: 98 F | WEIGHT: 113 LBS | RESPIRATION RATE: 18 BRPM | HEART RATE: 99 BPM | OXYGEN SATURATION: 96 % | DIASTOLIC BLOOD PRESSURE: 72 MMHG | SYSTOLIC BLOOD PRESSURE: 150 MMHG | BODY MASS INDEX: 20.02 KG/M2

## 2023-07-29 DIAGNOSIS — M25.551 RIGHT HIP PAIN: ICD-10-CM

## 2023-07-29 DIAGNOSIS — S02.2XXA CLOSED FRACTURE OF NASAL BONE, INITIAL ENCOUNTER: Primary | ICD-10-CM

## 2023-07-29 DIAGNOSIS — M79.604 RIGHT LEG PAIN: ICD-10-CM

## 2023-07-29 PROCEDURE — G0180 PR HOME HEALTH MD CERTIFICATION: ICD-10-PCS | Mod: ,,, | Performed by: FAMILY MEDICINE

## 2023-07-29 PROCEDURE — G0180 MD CERTIFICATION HHA PATIENT: HCPCS | Mod: ,,, | Performed by: FAMILY MEDICINE

## 2023-07-29 PROCEDURE — 99284 EMERGENCY DEPT VISIT MOD MDM: CPT | Mod: 25

## 2023-07-29 NOTE — ED PROVIDER NOTES
"Encounter Date: 7/29/2023       History     Chief Complaint   Patient presents with    Fall     Patient to ER CC of a fall hitting her head around her left eye, also CC of pain to her left knee and right hip, she denies LOC, and denies blood thinners      Ashley Mak is a 87 y.o. female that presents with left facial pain  Left facial pain & right hip pain after slip & fall this afternoon PTA  Unfortunately this patient has been falling quite a bit lately on HX  This patient has a brain tumor in his been unsteady on her feet  Patient was previously admitted last week for right hip hematoma  Patient fell again with right hip & femur pain this morning at home  Additionally the patient has left facial bruising that needs evaluation        Review of patient's allergies indicates:   Allergen Reactions    Sutures, silk     Iron analogues Rash     Past Medical History:   Diagnosis Date    Hypertension     Tumor     "in head"     Past Surgical History:   Procedure Laterality Date    CORONARY ANGIOPLASTY WITH STENT PLACEMENT       History reviewed. No pertinent family history.  Social History     Tobacco Use    Smoking status: Never    Smokeless tobacco: Never   Substance Use Topics    Alcohol use: Not Currently    Drug use: Never     Review of Systems   Constitutional: Negative.    HENT: Negative.     Eyes: Negative.    Respiratory: Negative.     Cardiovascular: Negative.    Gastrointestinal: Negative.    Genitourinary:  Negative for dysuria, urgency and vaginal discharge.   Musculoskeletal:         (+) right hip & right upper leg pain   Skin: Negative.    Neurological:  Positive for headaches.   Psychiatric/Behavioral: Negative.     All other systems reviewed and are negative.      Physical Exam     Initial Vitals [07/29/23 1744]   BP Pulse Resp Temp SpO2   (!) 150/72 99 18 98.3 °F (36.8 °C) 96 %      MAP       --         Physical Exam    Nursing note and vitals reviewed.  Constitutional: Vital signs are normal. She " appears well-developed and well-nourished. She is cooperative.   HENT:   Head: Normocephalic and atraumatic.   Right Ear: External ear normal.   Left Ear: External ear normal.   Nose: Nose normal.   Mouth/Throat: Oropharynx is clear and moist.   Eyes: Conjunctivae, EOM and lids are normal. Pupils are equal, round, and reactive to light.   Neck: Trachea normal and phonation normal. Neck supple. No JVD present.   Normal range of motion.   Full passive range of motion without pain.     Cardiovascular:  Normal rate, regular rhythm, S1 normal, S2 normal, normal heart sounds, intact distal pulses and normal pulses.           Pulmonary/Chest: Effort normal and breath sounds normal.   Abdominal: Abdomen is soft and flat. Bowel sounds are normal.   Musculoskeletal:         General: Normal range of motion.      Cervical back: Full passive range of motion without pain, normal range of motion and neck supple.     Neurological: She is alert and oriented to person, place, and time. She has normal strength.   Skin: Skin is intact. Capillary refill takes less than 2 seconds.   (+) bruising to the left face & right hip/femur area         ED Course   Procedures  Labs Reviewed - No data to display       Imaging Results               CT Cervical Spine Without Contrast (Final result)  Result time 07/29/23 19:13:19      Final result by Ovidio Graham MD (07/29/23 19:13:19)                   Impression:      1. No significant change from the recent prior study.  2. Very tiny defect in the C1 right transverse process anterior wall of the transverse foramen on axial 93 of series 3 is unchanged and could represent a tiny nondisplaced fracture, possibly subacute or chronic. No significant change.  3. Probable subacute fracture of the proximal right clavicle at the clavicular head.  This is similar to the recent prior study.  Recommend clinical correlation.  4. Multilevel chronic degenerative changes.  5.  This report was flagged in Epic as  abnormal.      Electronically signed by: Ovidio Graham  Date:    07/29/2023  Time:    19:13               Narrative:    EXAMINATION:  CT CERVICAL SPINE WITHOUT CONTRAST    CLINICAL HISTORY:  fall;    TECHNIQUE:  Low dose axial CT images through the cervical spine, with sagittal and coronal reformations.  Contrast was not administered.    COMPARISON:  07/25/2023    FINDINGS:  No acute fractures of the cervical spine.    Very tiny defect in the C1 right transverse process anterior wall of the transverse foramen on axial 93 of series 3 is unchanged and could represent a tiny nondisplaced fracture, possibly subacute or chronic.  No significant change.    Minimal retrolisthesis of C3 on C4.  Facets appear adequately position.    Mild diffuse posterior disc osteophyte complex at C3-4 and C6-7.    Severe foraminal narrowing at C6-7 on the left.    Mild central canal narrowing at C3-4.  Central canal is adequately maintained elsewhere in the cervical spine    Limited evaluation of the intraspinal contents demonstrates no hematoma or mass.Paraspinal soft tissues exhibit no acute abnormalities.    Probable subacute fracture of the proximal right clavicle at the clavicular head.  This is similar to the recent prior study.  Recommend clinical correlation.                                       CT Maxillofacial Without Contrast (Final result)  Result time 07/29/23 19:01:56      Final result by Ovidio Graham MD (07/29/23 19:01:56)                   Impression:      Small acute fractures of the right nasal ala with mild comminution and minimal displacement.  Recommend clinical correlation.      Electronically signed by: Ovidio Graham  Date:    07/29/2023  Time:    19:01               Narrative:    EXAMINATION:  CT MAXILLOFACIAL WITHOUT CONTRAST    CLINICAL HISTORY:  Facial trauma, blunt;    TECHNIQUE:  Low dose axial images, sagittal and coronal reformations were obtained through the face.  Contrast was not  administered.    COMPARISON:  None    FINDINGS:  Small acute fractures of the right nasal ala with mild comminution and minimal displacement.    The nasal septum is midline.    The orbits are intact.    Zygomatic arches are intact.  The mandible is intact.    Paranasal sinuses appear adequately maintained.  The globes are intact.    No soft tissue mass or fluid collection.                                       CT Head Without Contrast (Final result)  Result time 07/29/23 18:50:11      Final result by Ovidio Graham MD (07/29/23 18:50:11)                   Impression:      1. No acute intracranial process with no significant change from the study 4 days prior.  2. 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  See prior MRI report.  This could be associated with a meningioma, schwannoma or metastatic disease.  Recommend follow-up.  There is local mass effect and mild edema.  See above comments.  Recommend neurosurgical consultation and follow-up.  3. Mild prominence of the lateral and 3rd ventricle could represent mild hydrocephalus related to the posterior fossa mass/mass effect.  Chronic involutional changes could appear similar.  No change from the prior study.  Follow-up recommended.      Electronically signed by: Ovidio Graham  Date:    07/29/2023  Time:    18:50               Narrative:    EXAMINATION:  CT HEAD WITHOUT CONTRAST    CLINICAL HISTORY:  Facial trauma, blunt;    TECHNIQUE:  Low dose axial CT images obtained throughout the head without intravenous contrast. Sagittal and coronal reconstructions were performed.    COMPARISON:  07/25/2023    FINDINGS:  Intracranial compartment:    Mild prominence of the lateral and 3rd ventricles.  Temporal horns are visualized bilaterally.  No change from the recent prior study.  Mild hydrocephalus is not excluded.    No extra-axial blood or fluid collections.    Moderate involutional changes and chronic microvascular ischemic changes in the periventricular  and subcortical white matter.    There is a mixed density right cerebellopontine angle mass similar to the recent prior study and better assessed on prior MRI.  See prior MRI report 06/24/2023 also.  Mild adjacent edema.  There is slight mass effect on the adjacent brainstem and cerebellum.    Mild mass effect/compression of the 4th ventricle with a few mm midline shift to the left.    Skull/extracranial contents (limited evaluation): No fracture. Mastoid air cells and paranasal sinuses are essentially clear.                                       X-Ray Femur Ap/Lat Right (Final result)  Result time 07/29/23 18:52:09      Final result by Ovidio Graham MD (07/29/23 18:52:09)                   Impression:      No acute radiographic abnormality.      Electronically signed by: Ovidio Graham  Date:    07/29/2023  Time:    18:52               Narrative:    EXAMINATION:  XR FEMUR 2 VIEW RIGHT    CLINICAL HISTORY:  Pain in right leg    TECHNIQUE:  AP and lateral views of the right femur were performed.    COMPARISON:  07/25/2023    FINDINGS:  No acute fracture, subluxation or dislocation.  No mass or foreign body.  Right knee arthroplasty.  Mild degenerative changes of the right hip.    The superficial soft tissue thickening or edema lateral to the greater trochanter has a decreased from the prior study.    No detrimental change.                                       X-Ray Hip 2 or 3 views Right (with Pelvis when performed) (Final result)  Result time 07/29/23 18:41:24      Final result by Ovidio Graham MD (07/29/23 18:41:24)                   Impression:      No acute radiographic abnormality.      Electronically signed by: Ovidio Graham  Date:    07/29/2023  Time:    18:41               Narrative:    EXAMINATION:  XR HIP WITH PELVIS WHEN PERFORMED, 2 OR 3  VIEWS RIGHT    CLINICAL HISTORY:  Pain in right hip    TECHNIQUE:  AP view of the pelvis and frog leg lateral view of the right hip were  performed.    COMPARISON:  None    FINDINGS:  No acute fracture, subluxation or dislocation.  Mild degenerative changes of the hips bilaterally.  Degenerative changes of the lower lumbar spine.  The pubic rami and symphysis are intact.                                    X-Rays:   Independently Interpreted Readings:   Other Readings:  CT AND RADIOGRAPHIC IMAGING IS PENDING.     CT IMAGING REVEALED RIGHT NASAL ALA FRACTURES.     Medications - No data to display  Medical Decision Making:   Differential Diagnosis:   FRACTURE, DISLOCATION, CONTUSION, CLOSED HEAD INJURY  ED Management:  86 YO FEMALE WHO COMES IN TODAY DUE TO A FALL.  THE PATIENT DOES HAVE A HISTORY OF PRIOR FALLS.  SHE ALSO   HAS A HISTORY OF HAVING A BRAIN TUMOR PER THE PATIENT AND FAMILY.  THE PATIENT IS ABLE TO ANSWER ALL   QUESTIONS APPROPRIATELY.  SHE IS ALSO ABLE TO MOVE ALL EXTREMITIES.  ON EVALUATION, SHE DOES HAVE LEFT   PERIORBITAL SWELLING WITH ERYTHEMA.  SHE STATES THAT SHE IS HERE VISITING FROM [place].  HER FAMILY   IS PRESENT AT THE BEDSIDE.  HER EVALUATION IS PENDING.     CT IMAGING REVEALED RIGHT NASAL ALA FRACTURES.  HOME TODAY.                    Medical Decision Making  87-year-old with a history of brain tumor with recent fall this morning  Several falls recently due to chronic unsteadiness of gait per history  Left facial pain & bruising with right hip hematoma & leg pain today  No gross deformity with superficial bruising & hematoma noted now    Differential Diagnosis  Pain, strain, fracture, dislocation, soft tissue injury, hematoma    Problems Addressed:  Right hip pain: complicated acute illness or injury  Right leg pain: complicated acute illness or injury    Amount and/or Complexity of Data Reviewed  Radiology: ordered and independent interpretation performed.    ED Management & Risk of Complications, Morbidity, Mortality:  Head CT & CT of the face ordered for evaluation of the trauma today in the ER  Right femur & right hip  x-ray ordered in the emergency room no gross deformity  Will transition to the oncoming ER physician at the 6:00 p.m. shift change this pm       Clinical Impression:   Final diagnoses:  [M25.551] Right hip pain  [M79.604] Right leg pain  [S02.2XXA] Closed fracture of nasal bone, initial encounter (Primary)        ED Disposition Condition    Discharge Stable          ED Prescriptions    None       Follow-up Information    None          Anahi Caputo MD  07/29/23 6867

## 2023-07-30 NOTE — DISCHARGE INSTRUCTIONS
PLEASE READ THE HANDOUTS THAT WERE GIVEN TO YOU ON DISCHARGE.  FOLLOW UP WITH ENT ON DISCHARGE FOR FURTHER EVALUATION.  RETURN TO THE ED FOR WORSENING OF CONDITION.  MAY APPLY ICE TO THE AFFECTED SITE FOR 20 MINUTES THREE TIMES DAILY TO ASSIST WITH PAIN AND SWELLING.  MAY ALSO TAKE TYLENOL 650 MG BY MOUTH EVERY 6-8 HOURS AS NEEDED FOR PAIN.

## 2023-07-31 ENCOUNTER — DOCUMENT SCAN (OUTPATIENT)
Dept: HOME HEALTH SERVICES | Facility: HOSPITAL | Age: 88
End: 2023-07-31
Payer: MEDICARE

## 2023-08-01 ENCOUNTER — OFFICE VISIT (OUTPATIENT)
Dept: FAMILY MEDICINE | Facility: CLINIC | Age: 88
End: 2023-08-01
Payer: MEDICARE

## 2023-08-01 VITALS
WEIGHT: 116.31 LBS | DIASTOLIC BLOOD PRESSURE: 66 MMHG | SYSTOLIC BLOOD PRESSURE: 132 MMHG | HEART RATE: 100 BPM | RESPIRATION RATE: 16 BRPM | HEIGHT: 63 IN | BODY MASS INDEX: 20.61 KG/M2

## 2023-08-01 DIAGNOSIS — D36.10 SCHWANNOMA: ICD-10-CM

## 2023-08-01 DIAGNOSIS — D33.2 BENIGN NEOPLASM OF BRAIN, UNSPECIFIED BRAIN REGION: ICD-10-CM

## 2023-08-01 DIAGNOSIS — T14.8XXA HEMATOMA: Primary | ICD-10-CM

## 2023-08-01 PROCEDURE — 1126F PR PAIN SEVERITY QUANTIFIED, NO PAIN PRESENT: ICD-10-PCS | Mod: CPTII,S$GLB,, | Performed by: FAMILY MEDICINE

## 2023-08-01 PROCEDURE — 99214 PR OFFICE/OUTPT VISIT, EST, LEVL IV, 30-39 MIN: ICD-10-PCS | Mod: S$GLB,,, | Performed by: FAMILY MEDICINE

## 2023-08-01 PROCEDURE — 99214 OFFICE O/P EST MOD 30 MIN: CPT | Mod: S$GLB,,, | Performed by: FAMILY MEDICINE

## 2023-08-01 PROCEDURE — 1159F MED LIST DOCD IN RCRD: CPT | Mod: CPTII,S$GLB,, | Performed by: FAMILY MEDICINE

## 2023-08-01 PROCEDURE — 1126F AMNT PAIN NOTED NONE PRSNT: CPT | Mod: CPTII,S$GLB,, | Performed by: FAMILY MEDICINE

## 2023-08-01 PROCEDURE — 99999 PR PBB SHADOW E&M-EST. PATIENT-LVL III: CPT | Mod: PBBFAC,,, | Performed by: FAMILY MEDICINE

## 2023-08-01 PROCEDURE — 1100F PTFALLS ASSESS-DOCD GE2>/YR: CPT | Mod: CPTII,S$GLB,, | Performed by: FAMILY MEDICINE

## 2023-08-01 PROCEDURE — 1159F PR MEDICATION LIST DOCUMENTED IN MEDICAL RECORD: ICD-10-PCS | Mod: CPTII,S$GLB,, | Performed by: FAMILY MEDICINE

## 2023-08-01 PROCEDURE — 3288F PR FALLS RISK ASSESSMENT DOCUMENTED: ICD-10-PCS | Mod: CPTII,S$GLB,, | Performed by: FAMILY MEDICINE

## 2023-08-01 PROCEDURE — 3078F DIAST BP <80 MM HG: CPT | Mod: CPTII,S$GLB,, | Performed by: FAMILY MEDICINE

## 2023-08-01 PROCEDURE — 1100F PR PT FALLS ASSESS DOC 2+ FALLS/FALL W/INJURY/YR: ICD-10-PCS | Mod: CPTII,S$GLB,, | Performed by: FAMILY MEDICINE

## 2023-08-01 PROCEDURE — 3075F PR MOST RECENT SYSTOLIC BLOOD PRESS GE 130-139MM HG: ICD-10-PCS | Mod: CPTII,S$GLB,, | Performed by: FAMILY MEDICINE

## 2023-08-01 PROCEDURE — 3075F SYST BP GE 130 - 139MM HG: CPT | Mod: CPTII,S$GLB,, | Performed by: FAMILY MEDICINE

## 2023-08-01 PROCEDURE — 99999 PR PBB SHADOW E&M-EST. PATIENT-LVL III: ICD-10-PCS | Mod: PBBFAC,,, | Performed by: FAMILY MEDICINE

## 2023-08-01 PROCEDURE — 3078F PR MOST RECENT DIASTOLIC BLOOD PRESSURE < 80 MM HG: ICD-10-PCS | Mod: CPTII,S$GLB,, | Performed by: FAMILY MEDICINE

## 2023-08-01 PROCEDURE — 3288F FALL RISK ASSESSMENT DOCD: CPT | Mod: CPTII,S$GLB,, | Performed by: FAMILY MEDICINE

## 2023-08-01 RX ORDER — ACETAMINOPHEN 500 MG
1000 TABLET ORAL EVERY 8 HOURS
COMMUNITY
Start: 2023-06-30

## 2023-08-01 NOTE — PROGRESS NOTES
Transitional Care Note  Subjective:       Patient ID: Ashley Mak is a 87 y.o. female.  Chief Complaint: Follow-up (Patient here for a hospital follow up for a fall with a hematoma on her hip)    Family and/or Caretaker present at visit?  Yes.  Diagnostic tests reviewed/disposition: I have reviewed all completed as well as pending diagnostic tests at the time of discharge.  Disease/illness education: recent fall and large hematoma - right hip  Home health/community services discussion/referrals: Patient has home health established at [facility] .   Establishment or re-establishment of referral orders for community resources: No other necessary community resources.   Discussion with other health care providers: No discussion with other health care providers necessary.   87 year old female comes in for f/u after recurrent fall landed her in the hospital with a large hematoma.      Review of Systems   Constitutional:  Negative for chills and fever.   HENT:  Negative for congestion, ear pain, postnasal drip, rhinorrhea, sore throat and trouble swallowing.    Eyes:  Negative for redness and itching.   Respiratory:  Negative for cough, shortness of breath and wheezing.    Cardiovascular:  Negative for chest pain and palpitations.   Gastrointestinal:  Negative for abdominal pain, diarrhea, nausea and vomiting.   Genitourinary:  Negative for dysuria and frequency.   Musculoskeletal:  Positive for myalgias.   Skin:  Negative for rash.   Neurological:  Negative for weakness and headaches.        Imbalance   Hematological:  Bruises/bleeds easily.       Objective:      Physical Exam  Vitals and nursing note reviewed.   Constitutional:       General: She is not in acute distress.     Appearance: She is well-developed.   HENT:      Head: Normocephalic and atraumatic.   Eyes:      Conjunctiva/sclera: Conjunctivae normal.      Pupils: Pupils are equal, round, and reactive to light.   Neck:      Thyroid: No thyromegaly.    Cardiovascular:      Rate and Rhythm: Normal rate and regular rhythm.      Heart sounds: Normal heart sounds.   Pulmonary:      Effort: Pulmonary effort is normal. No respiratory distress.      Breath sounds: Normal breath sounds. No wheezing.   Abdominal:      General: Bowel sounds are normal.      Palpations: Abdomen is soft.      Tenderness: There is no abdominal tenderness.   Musculoskeletal:         General: Normal range of motion.      Cervical back: Normal range of motion and neck supple.   Lymphadenopathy:      Cervical: No cervical adenopathy.   Skin:     General: Skin is warm and dry.      Findings: No rash.      Comments: Large hematoma with surrounding ecchymosis to right thigh   Neurological:      Mental Status: She is alert and oriented to person, place, and time.   Psychiatric:         Behavior: Behavior normal.         Assessment:       1. Hematoma    2. Schwannoma    3. Benign neoplasm of brain, unspecified brain region        Plan:       Ashley was seen today for follow-up.    Diagnoses and all orders for this visit:    Hematoma    Schwannoma    Benign neoplasm of brain, unspecified brain region    Other orders  -     dexAMETHasone (DECADRON) 4 MG Tab; Take 1 tablet (4 mg total) by mouth every 12 (twelve) hours for 7 days, THEN 1 tablet (4 mg total) once daily for 7 days.    Discussed case with NS in [place].    Will cont with pt.    Reviewed repeat labs and h/h stable.    Start on decadron and then drop dosing and will f/u with ns once home.    RTC if condition acutely worsens or any other concerns, otherwise RTC as scheduled

## 2023-08-02 PROBLEM — D36.10 SCHWANNOMA: Status: ACTIVE | Noted: 2023-08-02

## 2023-08-02 PROBLEM — G93.5 NEOPLASM OF BRAIN CAUSING MASS EFFECT AND BRAIN COMPRESSION ON ADJACENT STRUCTURES: Status: ACTIVE | Noted: 2023-08-02

## 2023-08-02 PROBLEM — D49.6 NEOPLASM OF BRAIN CAUSING MASS EFFECT AND BRAIN COMPRESSION ON ADJACENT STRUCTURES: Status: ACTIVE | Noted: 2023-08-02

## 2023-08-02 PROBLEM — D33.2 BRAIN TUMOR (BENIGN): Status: ACTIVE | Noted: 2023-08-02

## 2023-08-03 RX ORDER — DEXAMETHASONE 4 MG/1
TABLET ORAL
Qty: 21 TABLET | Refills: 0 | Status: SHIPPED | OUTPATIENT
Start: 2023-08-03 | End: 2023-08-17

## 2023-08-15 ENCOUNTER — DOCUMENT SCAN (OUTPATIENT)
Dept: HOME HEALTH SERVICES | Facility: HOSPITAL | Age: 88
End: 2023-08-15
Payer: MEDICARE

## 2023-08-24 ENCOUNTER — EXTERNAL HOME HEALTH (OUTPATIENT)
Dept: HOME HEALTH SERVICES | Facility: HOSPITAL | Age: 88
End: 2023-08-24
Payer: MEDICARE

## 2023-08-25 ENCOUNTER — DOCUMENT SCAN (OUTPATIENT)
Dept: HOME HEALTH SERVICES | Facility: HOSPITAL | Age: 88
End: 2023-08-25
Payer: MEDICARE

## 2023-09-05 ENCOUNTER — HOSPITAL ENCOUNTER (EMERGENCY)
Facility: HOSPITAL | Age: 88
Discharge: ANOTHER HOSPITAL | End: 2023-09-06
Attending: STUDENT IN AN ORGANIZED HEALTH CARE EDUCATION/TRAINING PROGRAM
Payer: MEDICARE

## 2023-09-05 DIAGNOSIS — S09.90XA INJURY OF HEAD, INITIAL ENCOUNTER: Primary | ICD-10-CM

## 2023-09-05 DIAGNOSIS — R56.9 SEIZURE: ICD-10-CM

## 2023-09-05 LAB
ABO + RH BLD: NORMAL
ALBUMIN SERPL BCP-MCNC: 3.1 G/DL (ref 3.5–5.2)
ALP SERPL-CCNC: 72 U/L (ref 55–135)
ALT SERPL W/O P-5'-P-CCNC: 17 U/L (ref 10–44)
ANION GAP SERPL CALC-SCNC: 5 MMOL/L (ref 8–16)
APTT PPP: 24.5 SEC (ref 21–32)
AST SERPL-CCNC: 16 U/L (ref 10–40)
BASOPHILS # BLD AUTO: 0.02 K/UL (ref 0–0.2)
BASOPHILS NFR BLD: 0.3 % (ref 0–1.9)
BILIRUB SERPL-MCNC: 0.5 MG/DL (ref 0.1–1)
BLD GP AB SCN CELLS X3 SERPL QL: NORMAL
BUN SERPL-MCNC: 14 MG/DL (ref 8–23)
CALCIUM SERPL-MCNC: 10.1 MG/DL (ref 8.7–10.5)
CHLORIDE SERPL-SCNC: 106 MMOL/L (ref 95–110)
CO2 SERPL-SCNC: 32 MMOL/L (ref 23–29)
CREAT SERPL-MCNC: 0.8 MG/DL (ref 0.5–1.4)
DIFFERENTIAL METHOD: ABNORMAL
EOSINOPHIL # BLD AUTO: 0.1 K/UL (ref 0–0.5)
EOSINOPHIL NFR BLD: 1.9 % (ref 0–8)
ERYTHROCYTE [DISTWIDTH] IN BLOOD BY AUTOMATED COUNT: 12.5 % (ref 11.5–14.5)
EST. GFR  (NO RACE VARIABLE): >60 ML/MIN/1.73 M^2
GLUCOSE SERPL-MCNC: 148 MG/DL (ref 70–110)
HCT VFR BLD AUTO: 34.8 % (ref 37–48.5)
HGB BLD-MCNC: 11.4 G/DL (ref 12–16)
IMM GRANULOCYTES # BLD AUTO: 0.01 K/UL (ref 0–0.04)
IMM GRANULOCYTES NFR BLD AUTO: 0.2 % (ref 0–0.5)
INR PPP: 1 (ref 0.8–1.2)
LYMPHOCYTES # BLD AUTO: 1.4 K/UL (ref 1–4.8)
LYMPHOCYTES NFR BLD: 21.4 % (ref 18–48)
MCH RBC QN AUTO: 32.3 PG (ref 27–31)
MCHC RBC AUTO-ENTMCNC: 32.8 G/DL (ref 32–36)
MCV RBC AUTO: 99 FL (ref 82–98)
MONOCYTES # BLD AUTO: 0.4 K/UL (ref 0.3–1)
MONOCYTES NFR BLD: 5.9 % (ref 4–15)
NEUTROPHILS # BLD AUTO: 4.5 K/UL (ref 1.8–7.7)
NEUTROPHILS NFR BLD: 70.3 % (ref 38–73)
NRBC BLD-RTO: 0 /100 WBC
PLATELET # BLD AUTO: 171 K/UL (ref 150–450)
PMV BLD AUTO: 11.4 FL (ref 9.2–12.9)
POTASSIUM SERPL-SCNC: 3.9 MMOL/L (ref 3.5–5.1)
PROT SERPL-MCNC: 5.2 G/DL (ref 6–8.4)
PROTHROMBIN TIME: 11.1 SEC (ref 9–12.5)
RBC # BLD AUTO: 3.53 M/UL (ref 4–5.4)
SODIUM SERPL-SCNC: 143 MMOL/L (ref 136–145)
SPECIMEN OUTDATE: NORMAL
WBC # BLD AUTO: 6.4 K/UL (ref 3.9–12.7)

## 2023-09-05 PROCEDURE — 12002 RPR S/N/AX/GEN/TRNK2.6-7.5CM: CPT

## 2023-09-05 PROCEDURE — 85730 THROMBOPLASTIN TIME PARTIAL: CPT | Performed by: STUDENT IN AN ORGANIZED HEALTH CARE EDUCATION/TRAINING PROGRAM

## 2023-09-05 PROCEDURE — 99285 EMERGENCY DEPT VISIT HI MDM: CPT | Mod: 25

## 2023-09-05 PROCEDURE — 85025 COMPLETE CBC W/AUTO DIFF WBC: CPT | Performed by: STUDENT IN AN ORGANIZED HEALTH CARE EDUCATION/TRAINING PROGRAM

## 2023-09-05 PROCEDURE — 85610 PROTHROMBIN TIME: CPT | Performed by: STUDENT IN AN ORGANIZED HEALTH CARE EDUCATION/TRAINING PROGRAM

## 2023-09-05 PROCEDURE — 80053 COMPREHEN METABOLIC PANEL: CPT | Performed by: STUDENT IN AN ORGANIZED HEALTH CARE EDUCATION/TRAINING PROGRAM

## 2023-09-05 PROCEDURE — 86901 BLOOD TYPING SEROLOGIC RH(D): CPT | Performed by: STUDENT IN AN ORGANIZED HEALTH CARE EDUCATION/TRAINING PROGRAM

## 2023-09-05 NOTE — ED PROVIDER NOTES
"Encounter Date: 9/5/2023       History     Chief Complaint   Patient presents with    Fall     87-year-old female with history of hypertension and "a tumor in my head", presenting with a head injury.  Patient was with family, walked to the car, and fell hitting her head. No CP or SOb prior to event. No loss of consciousness.  Denies any other injuries.  No neck pain.  Patient bleeding profusely, takes only aspirin as blood thinner.  No numbness or weakness in bilateral upper lower extremities.  No other complaints.      Review of patient's allergies indicates:   Allergen Reactions    Tetracyclines Swelling     Doesn't remember clearly, rxn to anything "cycline    Sutures, silk     Iron analogues Rash    Nylon Rash     Sores all along nylon stitches     Past Medical History:   Diagnosis Date    Hypertension     Tumor     "in head"     Past Surgical History:   Procedure Laterality Date    CORONARY ANGIOPLASTY WITH STENT PLACEMENT       No family history on file.  Social History     Tobacco Use    Smoking status: Never    Smokeless tobacco: Never   Substance Use Topics    Alcohol use: Not Currently    Drug use: Never     Review of Systems   Constitutional:  Negative for fever.   HENT:  Negative for sore throat.         Head injury   Respiratory:  Negative for shortness of breath.    Cardiovascular:  Negative for chest pain.   Gastrointestinal:  Negative for nausea.   Genitourinary:  Negative for dysuria.   Musculoskeletal:  Negative for back pain.   Skin:  Negative for rash.   Neurological:  Negative for weakness.   Hematological:  Does not bruise/bleed easily.       Physical Exam     Initial Vitals [09/05/23 1023]   BP Pulse Resp Temp SpO2   -- -- -- 98.6 °F (37 °C) --      MAP       --         Physical Exam    Nursing note and vitals reviewed.  Constitutional: She appears well-developed.   HENT:   Head: Normocephalic.   Eyes: Pupils are equal, round, and reactive to light.   Neck:   Normal range of " motion.  Cardiovascular:            No murmur heard.  Pulmonary/Chest: No respiratory distress.   Abdominal: Abdomen is soft.   Musculoskeletal:         General: No edema.      Cervical back: Normal range of motion.     Neurological: She is alert.   Alert and oriented to person place and time. No facial droop. CN 2-12 intact. Fluent speech with expression and comprehension. Strength 5/5 and sensation intact in upper and lower extremities.    Skin: Skin is warm.   Psychiatric: She has a normal mood and affect.         ED Course   Lac Repair    Date/Time: 9/5/2023 11:43 AM    Performed by: Calin Arshad MD  Authorized by: Calin Arshad MD    Consent:     Consent obtained:  Verbal    Consent given by:  Patient    Risks discussed:  Infection, pain and poor cosmetic result  Universal protocol:     Procedure explained and questions answered to patient or proxy's satisfaction: yes      Patient identity confirmed:  Verbally with patient  Anesthesia:     Anesthesia method:  None  Laceration details:     Location:  Scalp    Scalp location:  Occipital    Length (cm):  3  Treatment:     Area cleansed with:  Soap and water and saline    Amount of cleaning:  Extensive  Skin repair:     Repair method:  Staples    Number of staples:  3  Approximation:     Approximation:  Close  Repair type:     Repair type:  Simple    Labs Reviewed   CBC W/ AUTO DIFFERENTIAL   COMPREHENSIVE METABOLIC PANEL   APTT   PROTIME-INR   TYPE & SCREEN          Imaging Results              CT Cervical Spine Without Contrast (In process)                      CT Head Without Contrast (In process)                      Medications - No data to display  Medical Decision Making  DDX:  Patient with head injury.  While attempting to clean patient's head to identify source of bleeding, patient had a period of decreased responsiveness, not answering questions.  During this.  Patient maintained a pulse, and regular breathing rate.  Patient had mild  nystagmus bilaterally.  Concern for possible focal seizure after her injury.  Patient was rushed to CT scan, after which she was speaking normally.  No memory of the event.  DX:  CT, CBC, CMP, coags, type and screen  TX:  Analgesia PRN  Dispo:  Pending workup        Amount and/or Complexity of Data Reviewed  Labs: ordered.  Radiology: ordered.                               Clinical Impression:   Final diagnoses:  [S09.90XA] Injury of head, initial encounter (Primary)  [R56.9] Seizure               Calin Arshad MD  09/05/23 1040       Calin Arshad MD  09/05/23 1048       Calin Arshad MD  09/05/23 1144

## 2023-09-05 NOTE — ED TRIAGE NOTES
87 y.o. female presents to ER ED 02/ED 02A   Chief Complaint   Patient presents with    Fall   .   Here per [facility], reports pt was getting in to the car and had a trip and fall hitting head

## 2023-09-05 NOTE — ED NOTES
Hair care provided to patient by RN and PCT to clean dried blood.   Pt sitting up in bed, provided meal tray.   EMELYN

## 2023-09-06 ENCOUNTER — HOSPITAL ENCOUNTER (OUTPATIENT)
Facility: HOSPITAL | Age: 88
Discharge: HOME HEALTH | End: 2023-09-09
Attending: HOSPITALIST | Admitting: HOSPITALIST
Payer: MEDICARE

## 2023-09-06 VITALS
HEIGHT: 63 IN | DIASTOLIC BLOOD PRESSURE: 90 MMHG | HEART RATE: 101 BPM | WEIGHT: 120 LBS | RESPIRATION RATE: 16 BRPM | TEMPERATURE: 99 F | OXYGEN SATURATION: 100 % | SYSTOLIC BLOOD PRESSURE: 178 MMHG | BODY MASS INDEX: 21.26 KG/M2

## 2023-09-06 DIAGNOSIS — R41.3 SHORT-TERM MEMORY LOSS: Primary | Chronic | ICD-10-CM

## 2023-09-06 DIAGNOSIS — R56.1 SEIZURE AFTER HEAD INJURY: ICD-10-CM

## 2023-09-06 PROBLEM — D49.6 NEOPLASM OF BRAIN CAUSING MASS EFFECT AND BRAIN COMPRESSION ON ADJACENT STRUCTURES: Chronic | Status: ACTIVE | Noted: 2023-08-02

## 2023-09-06 PROBLEM — I10 PRIMARY HYPERTENSION: Chronic | Status: ACTIVE | Noted: 2023-07-26

## 2023-09-06 PROBLEM — G93.5 NEOPLASM OF BRAIN CAUSING MASS EFFECT AND BRAIN COMPRESSION ON ADJACENT STRUCTURES: Chronic | Status: ACTIVE | Noted: 2023-08-02

## 2023-09-06 PROBLEM — R41.9 DECREASED ALERTNESS: Status: ACTIVE | Noted: 2023-09-06

## 2023-09-06 PROBLEM — R53.81 DEBILITY: Status: ACTIVE | Noted: 2023-09-06

## 2023-09-06 PROBLEM — D36.10 SCHWANNOMA: Chronic | Status: ACTIVE | Noted: 2023-08-02

## 2023-09-06 PROBLEM — R29.90 EPISODE OF TRANSIENT NEUROLOGIC SYMPTOMS: Status: ACTIVE | Noted: 2023-09-06

## 2023-09-06 PROBLEM — I25.10 CAD (CORONARY ARTERY DISEASE): Chronic | Status: ACTIVE | Noted: 2023-07-26

## 2023-09-06 PROCEDURE — 99222 1ST HOSP IP/OBS MODERATE 55: CPT | Mod: ,,, | Performed by: HOSPITALIST

## 2023-09-06 PROCEDURE — 25000003 PHARM REV CODE 250: Performed by: HOSPITALIST

## 2023-09-06 PROCEDURE — G0378 HOSPITAL OBSERVATION PER HR: HCPCS

## 2023-09-06 PROCEDURE — 99222 PR INITIAL HOSPITAL CARE,LEVL II: ICD-10-PCS | Mod: ,,, | Performed by: HOSPITALIST

## 2023-09-06 PROCEDURE — G0379 DIRECT REFER HOSPITAL OBSERV: HCPCS

## 2023-09-06 RX ORDER — ASPIRIN 81 MG/1
81 TABLET ORAL DAILY
Status: DISCONTINUED | OUTPATIENT
Start: 2023-09-07 | End: 2023-09-09 | Stop reason: HOSPADM

## 2023-09-06 RX ORDER — DOCUSATE SODIUM 100 MG/1
100 CAPSULE, LIQUID FILLED ORAL 2 TIMES DAILY
Status: DISCONTINUED | OUTPATIENT
Start: 2023-09-07 | End: 2023-09-09 | Stop reason: HOSPADM

## 2023-09-06 RX ORDER — ACETAMINOPHEN 325 MG/1
650 TABLET ORAL EVERY 4 HOURS PRN
Status: DISCONTINUED | OUTPATIENT
Start: 2023-09-06 | End: 2023-09-09 | Stop reason: HOSPADM

## 2023-09-06 RX ORDER — IBUPROFEN 200 MG
24 TABLET ORAL
Status: DISCONTINUED | OUTPATIENT
Start: 2023-09-06 | End: 2023-09-09 | Stop reason: HOSPADM

## 2023-09-06 RX ORDER — SODIUM CHLORIDE 0.9 % (FLUSH) 0.9 %
10 SYRINGE (ML) INJECTION
Status: DISCONTINUED | OUTPATIENT
Start: 2023-09-06 | End: 2023-09-09 | Stop reason: HOSPADM

## 2023-09-06 RX ORDER — SODIUM CHLORIDE 9 MG/ML
INJECTION, SOLUTION INTRAVENOUS CONTINUOUS
Status: ACTIVE | OUTPATIENT
Start: 2023-09-06 | End: 2023-09-07

## 2023-09-06 RX ORDER — AMLODIPINE BESYLATE 5 MG/1
5 TABLET ORAL DAILY
Status: DISCONTINUED | OUTPATIENT
Start: 2023-09-07 | End: 2023-09-09 | Stop reason: HOSPADM

## 2023-09-06 RX ORDER — ATORVASTATIN CALCIUM 20 MG/1
20 TABLET, FILM COATED ORAL DAILY
Status: DISCONTINUED | OUTPATIENT
Start: 2023-09-07 | End: 2023-09-09 | Stop reason: HOSPADM

## 2023-09-06 RX ORDER — NALOXONE HCL 0.4 MG/ML
0.02 VIAL (ML) INJECTION
Status: DISCONTINUED | OUTPATIENT
Start: 2023-09-06 | End: 2023-09-09 | Stop reason: HOSPADM

## 2023-09-06 RX ORDER — TALC
6 POWDER (GRAM) TOPICAL NIGHTLY PRN
Status: DISCONTINUED | OUTPATIENT
Start: 2023-09-06 | End: 2023-09-09 | Stop reason: HOSPADM

## 2023-09-06 RX ORDER — IBUPROFEN 200 MG
16 TABLET ORAL
Status: DISCONTINUED | OUTPATIENT
Start: 2023-09-06 | End: 2023-09-09 | Stop reason: HOSPADM

## 2023-09-06 RX ORDER — ONDANSETRON 2 MG/ML
4 INJECTION INTRAMUSCULAR; INTRAVENOUS EVERY 8 HOURS PRN
Status: DISCONTINUED | OUTPATIENT
Start: 2023-09-06 | End: 2023-09-09 | Stop reason: HOSPADM

## 2023-09-06 RX ORDER — PROCHLORPERAZINE EDISYLATE 5 MG/ML
5 INJECTION INTRAMUSCULAR; INTRAVENOUS EVERY 6 HOURS PRN
Status: DISCONTINUED | OUTPATIENT
Start: 2023-09-06 | End: 2023-09-09 | Stop reason: HOSPADM

## 2023-09-06 RX ADMIN — SODIUM CHLORIDE: 0.9 INJECTION, SOLUTION INTRAVENOUS at 11:09

## 2023-09-06 NOTE — ED NOTES
Pt called call bell stating that she needed to have a BM. Pt using the toilet and bedpan throughout the day without incident. Pt assisted to toilet with wheelchair and nurse assistance. Advised pt to use call bell when done and asked patient not to get up alone. Pt requesting door closed for privacy. RN to bathroom to check on patient, pt found on floor. Pt AAO and at baseline. Pt assisted from floor back to wheelchair. VS obtained. Mild bleeding noted at lac site. Pressure held and bleeding controlled. Dr. Poole notified of incident. Dr. Poole to bedside to see patient. [facility] in department to get patient. Dr. Poole states pt is safe for transfer.

## 2023-09-06 NOTE — ED NOTES
Spoke with [facility], gave update on pt. [facility] states they have several pending discharges and hope to assign a bed to MsJaspal Ashley soon.

## 2023-09-06 NOTE — ED NOTES
Hourly Patient Rounds     Patient lying in bed. Family present  at bedside.   Side rails up x 3. Call light within reach.   Pt AAO x 3 and in no distress.  Pain, position, and potty addressed.    Updated pt on plan of care and answered all questions.   Door closed for privacy.

## 2023-09-06 NOTE — ED NOTES
Notified by [facility] that patient has been assigned bed 903 at [facility]. Number for report is [phone].

## 2023-09-06 NOTE — PROVIDER TRANSFER
Outside Transfer Acceptance Note / [facility]    Referring facility: [facility]   Referring provider: TAMEKA LYNN  Accepting facility: [facility]  Accepting provider: Sandy Schrader   Admitting provider: Sandy Schrader   Reason for transfer:  Neurosurgery evaluation   Transfer diagnosis:   Transfer specialty requested: Neurosurgery  Transfer specialty notified: Yes  Transfer level: NUMBER 1-5: 2  Bed type requested: Telemetry   Isolation status: No active isolations   Admission class or status: IP- Inpatient      Narrative     The patient is an 86 y/o female with PMH of HTN and brain tumor who presented today s/p fall. She was walking to her car and fell and hit her head. She did have a laceration which needed to be repaired but while this was being done in the ER she had a period of decreased responsiveness, nystagmus, and unable to answer questions that was concerning for seizurs.  CT head showed: Scalp injury without evidence of an underlying skull fracture or intracranial hemorrhage. 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 CT C-spine negative for any fracture. Patient is alert and conversant. There is concern for post concussive seizure. Case was discussed with neurosurgery and will consult. Labs and vitals stable.     Objective     Vitals: Temp: 98.6 °F (37 °C) (09/05/23 1023)  Pulse: 80 (09/05/23 2201)  Resp: 18 (09/05/23 1217)  BP: 116/67 (09/05/23 2201)  SpO2: 97 % (09/05/23 2201)  Recent Labs: All pertinent labs within the past 24 hours have been reviewed.  Recent imaging: see ct    Airway:     Vent settings:         IV access:        Peripheral IV - Single Lumen 09/05/23 2139 20 G Anterior;Right Forearm (Active)   Site Assessment Clean;Dry;Intact 09/05/23 2139   Extremity Assessment  "Distal to IV No abnormal discoloration;No redness;No swelling 09/05/23 2139   Dressing Status Clean;Dry;Intact 09/05/23 2139   Dressing Intervention First dressing 09/05/23 2139     Infusions:   Allergies:   Review of patient's allergies indicates:   Allergen Reactions    Tetracyclines Swelling     Doesn't remember clearly, rxn to anything "cycline    Sutures, silk     Iron analogues Rash    Nylon Rash     Sores all along nylon stitches      NPO: No    Anticoagulation:   Anticoagulants       None             Instructions      Arron Mahmood-  Admit to Hospital Medicine  Upon patient arrival to floor, please send SecureChat to [facility] or call extension [phone] (if no answer, do NOT leave a callback number after the beep, rather please send a SecureChat to [facility]), for Hospital Medicine admit team assignment and for additional admit orders for the patient.  Do not page the attending physician associated with the patient on arrival (this physician may not be on duty at the time of arrival).  Rather, always send a SecureChat to [facility] or call [phone] to reach the triage physician for orders and team assignment.    "

## 2023-09-06 NOTE — ED NOTES
Report received from Diane HA. Pt resting comfortably. NAD. Awaiting bed assignment at [facility] main [facility].

## 2023-09-06 NOTE — ED NOTES
Hourly Patient Rounds     Patient lying in bed. Family present  at bedside.   Side rails up x 4. Call light within reach.   Pt AAO x 3 and in no distress. Bleeding to scalp controlled. Pt denies headache/complaints  Pain, position, and potty addressed.    Updated pt on plan of care and answered all questions.   Door closed for privacy.

## 2023-09-06 NOTE — ED NOTES
Hourly Patient Rounds     Patient lying in bed, on back, and resting. Family present  at bedside.   Side rails up x 2. Call light within reach.   Pt AAO x 4 and in no distress.  Pain, position, and potty addressed.    Updated pt on plan of care and answered all questions.   Door closed for privacy.

## 2023-09-06 NOTE — ED NOTES
Attempted report again. No answer.    Pt advised since PCP has not seen her for this problem , is not able to fill this     If her problem persist needing follow up with PCP then needs to be make a f/u visit, then can discuss form      Advise to reappeal to her employer if work note is ok? (si

## 2023-09-06 NOTE — ED NOTES
"Entered room and found pt with both IVs taken out. When asked what happened pt stated "I just took them out because they have been in there for too long." LELAND Robles at bedside attempting new IV. Pt educated on the importance of having IV in place.   "

## 2023-09-07 PROBLEM — S30.0XXA TRAUMATIC HEMATOMA OF BUTTOCK: Status: RESOLVED | Noted: 2023-07-26 | Resolved: 2023-09-07

## 2023-09-07 PROBLEM — R41.3 MEMORY DEFICITS: Chronic | Status: ACTIVE | Noted: 2023-09-07

## 2023-09-07 LAB
ALBUMIN SERPL BCP-MCNC: 3 G/DL (ref 3.5–5.2)
ALP SERPL-CCNC: 66 U/L (ref 55–135)
ALT SERPL W/O P-5'-P-CCNC: 13 U/L (ref 10–44)
ANION GAP SERPL CALC-SCNC: 7 MMOL/L (ref 8–16)
AST SERPL-CCNC: 14 U/L (ref 10–40)
BASOPHILS # BLD AUTO: 0.02 K/UL (ref 0–0.2)
BASOPHILS NFR BLD: 0.2 % (ref 0–1.9)
BILIRUB SERPL-MCNC: 0.3 MG/DL (ref 0.1–1)
BUN SERPL-MCNC: 11 MG/DL (ref 8–23)
CALCIUM SERPL-MCNC: 9.5 MG/DL (ref 8.7–10.5)
CHLORIDE SERPL-SCNC: 109 MMOL/L (ref 95–110)
CK SERPL-CCNC: 67 U/L (ref 20–180)
CO2 SERPL-SCNC: 27 MMOL/L (ref 23–29)
CREAT SERPL-MCNC: 0.6 MG/DL (ref 0.5–1.4)
DIFFERENTIAL METHOD: ABNORMAL
EOSINOPHIL # BLD AUTO: 0.1 K/UL (ref 0–0.5)
EOSINOPHIL NFR BLD: 1.5 % (ref 0–8)
ERYTHROCYTE [DISTWIDTH] IN BLOOD BY AUTOMATED COUNT: 12.4 % (ref 11.5–14.5)
EST. GFR  (NO RACE VARIABLE): >60 ML/MIN/1.73 M^2
GLUCOSE SERPL-MCNC: 100 MG/DL (ref 70–110)
HCT VFR BLD AUTO: 29 % (ref 37–48.5)
HGB BLD-MCNC: 9.3 G/DL (ref 12–16)
IMM GRANULOCYTES # BLD AUTO: 0.03 K/UL (ref 0–0.04)
IMM GRANULOCYTES NFR BLD AUTO: 0.3 % (ref 0–0.5)
LYMPHOCYTES # BLD AUTO: 1.1 K/UL (ref 1–4.8)
LYMPHOCYTES NFR BLD: 12.9 % (ref 18–48)
MCH RBC QN AUTO: 31.8 PG (ref 27–31)
MCHC RBC AUTO-ENTMCNC: 32.1 G/DL (ref 32–36)
MCV RBC AUTO: 99 FL (ref 82–98)
MONOCYTES # BLD AUTO: 0.5 K/UL (ref 0.3–1)
MONOCYTES NFR BLD: 5.8 % (ref 4–15)
NEUTROPHILS # BLD AUTO: 7 K/UL (ref 1.8–7.7)
NEUTROPHILS NFR BLD: 79.3 % (ref 38–73)
NRBC BLD-RTO: 0 /100 WBC
PLATELET # BLD AUTO: 160 K/UL (ref 150–450)
PMV BLD AUTO: 11.4 FL (ref 9.2–12.9)
POTASSIUM SERPL-SCNC: 3.6 MMOL/L (ref 3.5–5.1)
PROT SERPL-MCNC: 5.1 G/DL (ref 6–8.4)
RBC # BLD AUTO: 2.92 M/UL (ref 4–5.4)
SODIUM SERPL-SCNC: 143 MMOL/L (ref 136–145)
TSH SERPL DL<=0.005 MIU/L-ACNC: 1.81 UIU/ML (ref 0.4–4)
WBC # BLD AUTO: 8.78 K/UL (ref 3.9–12.7)

## 2023-09-07 PROCEDURE — 97166 OT EVAL MOD COMPLEX 45 MIN: CPT

## 2023-09-07 PROCEDURE — 85025 COMPLETE CBC W/AUTO DIFF WBC: CPT | Performed by: HOSPITALIST

## 2023-09-07 PROCEDURE — 97116 GAIT TRAINING THERAPY: CPT

## 2023-09-07 PROCEDURE — 99203 OFFICE O/P NEW LOW 30 MIN: CPT | Mod: ,,, | Performed by: NEUROLOGICAL SURGERY

## 2023-09-07 PROCEDURE — A9585 GADOBUTROL INJECTION: HCPCS | Performed by: HOSPITALIST

## 2023-09-07 PROCEDURE — 36415 COLL VENOUS BLD VENIPUNCTURE: CPT | Performed by: HOSPITALIST

## 2023-09-07 PROCEDURE — 99233 PR SUBSEQUENT HOSPITAL CARE,LEVL III: ICD-10-PCS | Mod: ,,, | Performed by: HOSPITALIST

## 2023-09-07 PROCEDURE — 25000003 PHARM REV CODE 250: Performed by: HOSPITALIST

## 2023-09-07 PROCEDURE — 80053 COMPREHEN METABOLIC PANEL: CPT | Performed by: HOSPITALIST

## 2023-09-07 PROCEDURE — G0378 HOSPITAL OBSERVATION PER HR: HCPCS

## 2023-09-07 PROCEDURE — 99233 SBSQ HOSP IP/OBS HIGH 50: CPT | Mod: ,,, | Performed by: HOSPITALIST

## 2023-09-07 PROCEDURE — 99203 PR OFFICE/OUTPT VISIT, NEW, LEVL III, 30-44 MIN: ICD-10-PCS | Mod: ,,, | Performed by: NEUROLOGICAL SURGERY

## 2023-09-07 PROCEDURE — 82550 ASSAY OF CK (CPK): CPT | Performed by: HOSPITALIST

## 2023-09-07 PROCEDURE — 25500020 PHARM REV CODE 255: Performed by: HOSPITALIST

## 2023-09-07 PROCEDURE — 97112 NEUROMUSCULAR REEDUCATION: CPT

## 2023-09-07 PROCEDURE — 97530 THERAPEUTIC ACTIVITIES: CPT

## 2023-09-07 PROCEDURE — 84443 ASSAY THYROID STIM HORMONE: CPT | Performed by: HOSPITALIST

## 2023-09-07 PROCEDURE — 97535 SELF CARE MNGMENT TRAINING: CPT

## 2023-09-07 RX ORDER — GADOBUTROL 604.72 MG/ML
6 INJECTION INTRAVENOUS
Status: COMPLETED | OUTPATIENT
Start: 2023-09-07 | End: 2023-09-07

## 2023-09-07 RX ADMIN — DOCUSATE SODIUM 100 MG: 100 CAPSULE, LIQUID FILLED ORAL at 08:09

## 2023-09-07 RX ADMIN — AMLODIPINE BESYLATE 5 MG: 5 TABLET ORAL at 08:09

## 2023-09-07 RX ADMIN — ASPIRIN 81 MG: 81 TABLET, COATED ORAL at 08:09

## 2023-09-07 RX ADMIN — Medication 6 MG: at 08:09

## 2023-09-07 RX ADMIN — GADOBUTROL 6 ML: 604.72 INJECTION INTRAVENOUS at 04:09

## 2023-09-07 RX ADMIN — ATORVASTATIN CALCIUM 20 MG: 20 TABLET, FILM COATED ORAL at 08:09

## 2023-09-07 NOTE — ASSESSMENT & PLAN NOTE
-Pt. With alteration in conciousness occurring sometime after she had head trauma. Unclear cause, CT Head stable from prior  -Pt. Without any further episodes in last ~24hrs. Mentation seems to be returned near baseline although she is having short-term memory issues, daughter states these may be chronic  -Monitor in obs with seizure precautions ordered, neurosurgery consulted for evaluation

## 2023-09-07 NOTE — PLAN OF CARE
POC established and functional mobility goals were created to help pt return to PLOF. Will be reassessed as appropriate to measure pt progress.    Problem: Physical Therapy  Goal: Physical Therapy Goal  Description: Goals to be met by: 23     Patient will increase functional independence with mobility by performin. Supine to sit with Stand-by Assistance  2. Sit to supine with Stand-by Assistance  3. Sit to stand transfer with Stand-by Assistance  4. Bed to chair transfer with Stand-by Assistance using LRAD as needed  5. Gait  x 150 feet with Stand-by Assistance using LRAD as needed.   6. Lower extremity exercise program x15 reps per handout, with assistance as needed    Outcome: Ongoing, Progressing

## 2023-09-07 NOTE — PT/OT/SLP EVAL
Occupational Therapy   Co-Evaluation with PT  Co-evaluation/treatment performed due to patient's multiple deficits requiring two skilled therapists to appropriately and safely assess patient's strength, endurance, functional mobility, and ADL performance while facilitating functional tasks in addition to accommodating for patient's activity tolerance and medical acuity.    Name: Ashley Mak  MRN: [id number]  Admitting Diagnosis: Episode of transient neurologic symptoms  Recent Surgery: * No surgery found *      Recommendations:     Discharge Recommendations: nursing facility, skilled  Discharge Equipment Recommendations:  to be determined by next level of care  Barriers to discharge:  Other (Comment) (increased level of skilled assist required)    Assessment:     Ashley Mak is a 87 y.o. female with a medical diagnosis of Episode of transient neurologic symptoms.  She presents with the following performance deficits affecting function: weakness, impaired endurance, impaired self care skills, impaired functional mobility, gait instability, impaired balance, decreased coordination, impaired cognition, decreased lower extremity function, decreased upper extremity function, decreased safety awareness, impaired fine motor, decreased ROM.  Pt is A&Ox1 and is unable to provide a history. Pt's son reported that PLOF is mod(I) with use of either rollator or cane, but he is unsure about her level of independence in regards to ADLs. Pt had an episode of urinary incontinence during ambulation and is a high fall risk at this time. Pt would benefit from SNF when medically stable to facilitate safe return to PLOF, maximize functional independence and quality of life, and decrease caregiver burden.      Rehab Prognosis: Good; patient would benefit from acute skilled OT services to address these deficits and reach maximum level of function.       Plan:     Patient to be seen 3 x/week to address the above listed problems via  "self-care/home management, therapeutic activities, therapeutic exercises, neuromuscular re-education, cognitive retraining  Plan of Care Expires: 10/07/23  Plan of Care Reviewed with:      Subjective     Chief Complaint: Urinary incontinence  Patient/Family Comments/goals: "I have to pee. Oh look, I peed"    Occupational Profile:  Living Environment: Pt lives with daughter in [facility] with 0STE. Bathroom setup consists of a tub/shower combo.   Previous level of function: Pt's son reported that she performs ADLs and functional mobility at a Mod I level with a cane or rollator  Roles and Routines: Pt enjoys watching movies  Equipment Used at Home: rollator, cane, quad, cane, straight (pt's son unsure if cane is SPC or QC)  Assistance upon Discharge: daughter    Pain/Comfort:  Pain Rating 1: 0/10    Patients cultural, spiritual, Yarsani conflicts given the current situation: no    Objective:     Communicated with: RN prior to session.  Patient found HOB elevated with bed alarm, blood pressure cuff, peripheral IV, telemetry upon OT entry to room.    General Precautions: Standard, fall  Orthopedic Precautions: N/A  Braces: N/A  Respiratory Status: Room air    Occupational Performance:    Bed Mobility:    Patient completed Rolling/Turning to Right with minimum assistance  Patient completed Supine to Sit with minimum assistance  Patient completed Sit to Supine with minimum assistance    Functional Mobility/Transfers:  Patient completed Sit <> Stand Transfer with minimum assistance  with  rolling walker   Pt completed Chair>Bed Transfer with minimum assistance with rolling walker  Functional Mobility: Patient ambulated 12' with Min A and RW to promote functional mobility.     Activities of Daily Living:  Grooming: moderate assistance to complete hair combing  Bathing: maximal assistance to complete bed bath following urinary incontinence episode  Upper Body Dressing: maximal assistance to change gown  Lower Body Dressing: " minimum assistance to change socks  Toileting: dependence incontinent of urine , OT completed hygiene    Cognitive/Visual Perceptual:  Cognitive/Psychosocial Skills:     -       Oriented to: Person   -       Follows Commands/attention:Follows one-step commands  -       Safety awareness/insight to disability: impaired     Physical Exam:  Balance:    -       Fair  Sensation:    -       Intact  Dominant hand:    -       Right  Upper Extremity Range of Motion:     -       Right Upper Extremity: WFL  -       Left Upper Extremity: WFL  Upper Extremity Strength:    -       Right Upper Extremity: WFL  -       Left Upper Extremity: WFL   Strength:    -       Right Upper Extremity: Impaired - R 4th digit remains in flexion at baseline 2/2 arthritis  -       Left Upper Extremity: Impaired    AMPAC 6 Click ADL:  AMPAC Total Score: 11    Treatment & Education:  Pt educated on the following:  - role of OT and OT POC  - use of call light to request for assistance with all functional mobility to ensure safety during hospital stay  - importance of continued mobilization  - benefits of continued participation in therapy.   - Pt educated on importance of calling for staff assist for functional mobility/transfers.  - All pt questions within OT scope of practice addressed, pt verbalized understanding.      Patient left HOB elevated with all lines intact, call button in reach, bed alarm on, RN notified, and son present    GOALS:   Multidisciplinary Problems       Occupational Therapy Goals          Problem: Occupational Therapy    Goal Priority Disciplines Outcome Interventions   Occupational Therapy Goal     OT, PT/OT Ongoing, Progressing    Description: Goals to be met by: 10/7/23     Patient will increase functional independence with ADLs by performing:    UE Dressing with Stand-by Assistance.  LE Dressing with Stand-by Assistance.  Grooming while EOB with Stand-by Assistance.  Supine to sit with Supervision.  Step transfer with  "Contact Guard Assistance                         History:     Past Medical History:   Diagnosis Date    Hypertension     Traumatic hematoma of buttock 7/26/2023    Tumor     "in head"         Past Surgical History:   Procedure Laterality Date    CORONARY ANGIOPLASTY WITH STENT PLACEMENT         Time Tracking:     OT Date of Treatment: 09/07/23  OT Start Time: 0908  OT Stop Time: 0946  OT Total Time (min): 38 min    Billable Minutes:Evaluation 10  Self Care/Home Management 15  Neuromuscular Re-education 13    9/7/2023  "

## 2023-09-07 NOTE — HPI
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 Pt lived in [place], but has been in [place] with her daughter for the past month. She had an episode where she fell backwards and hit her head on furniture. Afterwards, she had an episode of inability to speak and decreased responsiveness. This self resolved and she is back to neurologic baseline. She denies headache or focal weakness.

## 2023-09-07 NOTE — SUBJECTIVE & OBJECTIVE
"Past Medical History:   Diagnosis Date    Hypertension     Tumor     "in head"       Past Surgical History:   Procedure Laterality Date    CORONARY ANGIOPLASTY WITH STENT PLACEMENT         Review of patient's allergies indicates:   Allergen Reactions    Tetracyclines Swelling     Doesn't remember clearly, rxn to anything "cycline    Sutures, silk     Iron analogues Rash    Nylon Rash     Sores all along nylon stitches       No current facility-administered medications on file prior to encounter.     Current Outpatient Medications on File Prior to Encounter   Medication Sig    acetaminophen (TYLENOL) 500 MG tablet Take 1,000 mg by mouth every 8 (eight) hours.    amLODIPine (NORVASC) 5 MG tablet Take 5 mg by mouth once daily.    aspirin (ECOTRIN) 81 MG EC tablet Take 81 mg by mouth once daily.    atorvastatin (LIPITOR) 20 MG tablet Take 1 tablet by mouth once daily.    calcium carbonate (TUMS) 200 mg calcium (500 mg) chewable tablet Take 500 mg by mouth once daily.    cholecalciferol, vitamin D3, (VITAMIN D3) 25 mcg (1,000 unit) capsule Take 1,000 Units by mouth.    denosumab (PROLIA) 60 mg/mL Syrg Inject 60 mg into the skin.    docusate sodium (COLACE) 100 MG capsule Take 100 mg by mouth 2 (two) times daily.    linaCLOtide (LINZESS) 145 mcg Cap capsule Take 145 mcg by mouth.    MULTIVITAMIN ORAL Take 1 tablet by mouth once daily.    polyethylene glycol (GLYCOLAX) 17 gram/dose powder Take 17 g by mouth.    sodium chloride (OCEAN) 0.65 % nasal spray 1 spray by Nasal route daily as needed.     Family History    None       Tobacco Use    Smoking status: Never    Smokeless tobacco: Never   Substance and Sexual Activity    Alcohol use: Not Currently    Drug use: Never    Sexual activity: Not Currently     Review of Systems   Constitutional:  Positive for activity change. Negative for appetite change, chills, fever and unexpected weight change.   HENT:  Negative for congestion and sore throat.         Head trauma "   Respiratory:  Negative for cough and shortness of breath.    Cardiovascular:  Negative for chest pain, palpitations and leg swelling.   Gastrointestinal:  Negative for abdominal distention, abdominal pain, blood in stool, constipation, diarrhea, nausea and vomiting.   Genitourinary:  Negative for difficulty urinating, dysuria and hematuria.   Musculoskeletal:  Positive for arthralgias. Negative for myalgias.   Skin:  Negative for color change and rash.   Neurological:  Positive for syncope. Negative for dizziness, tremors and seizures.   Psychiatric/Behavioral:  Positive for confusion.      Objective:     Vital Signs (Most Recent):  Temp: 98.5 °F (36.9 °C) (09/06/23 2145)  Pulse: 80 (09/06/23 2145)  Resp: 18 (09/06/23 2145)  BP: 136/64 (09/06/23 2145)  SpO2: 98 % (09/06/23 2145) Vital Signs (24h Range):  Temp:  [98.5 °F (36.9 °C)-98.7 °F (37.1 °C)] 98.5 °F (36.9 °C)  Pulse:  [] 80  Resp:  [16-18] 18  SpO2:  [97 %-100 %] 98 %  BP: (116-178)/() 136/64        There is no height or weight on file to calculate BMI.     Physical Exam  Vitals reviewed.   Constitutional:       General: She is not in acute distress.     Appearance: She is well-developed.   HENT:      Head: Normocephalic and atraumatic.   Eyes:      Extraocular Movements: Extraocular movements intact.      Pupils: Pupils are equal, round, and reactive to light.   Neck:      Vascular: No JVD.      Trachea: No tracheal deviation.   Cardiovascular:      Rate and Rhythm: Normal rate and regular rhythm.      Heart sounds: No murmur heard.     No friction rub. No gallop.   Pulmonary:      Effort: No respiratory distress.      Breath sounds: Normal breath sounds. No wheezing or rales.   Abdominal:      General: Bowel sounds are normal. There is no distension.      Palpations: Abdomen is soft. There is no mass.      Tenderness: There is no abdominal tenderness.   Musculoskeletal:         General: No deformity.      Cervical back: Neck supple.    Lymphadenopathy:      Cervical: No cervical adenopathy.   Skin:     General: Skin is warm and dry.      Findings: No rash.   Neurological:      Mental Status: She is alert. Mental status is at baseline. She is disoriented.      Comments: Pt. Unable to recall reason for admission, today's events              CRANIAL NERVES     CN III, IV, VI   Pupils are equal, round, and reactive to light.       Significant Labs: All pertinent labs within the past 24 hours have been reviewed.    Significant Imaging: I have reviewed all pertinent imaging results/findings within the past 24 hours.

## 2023-09-07 NOTE — CONSULTS
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 Pt lived in [place], but has been in [place] with her daughter for the past month. She had an episode where she fell backwards and hit her head on furniture. Afterwards, she had an episode of inability to speak and decreased responsiveness. This self resolved and she is back to neurologic baseline. She denies headache or focal weakness.       Post-Op Info:  * No surgery found *         Past Medical History:   Diagnosis Date    Hypertension     Traumatic hematoma of buttock 7/26/2023    Tumor     "in head"       Past Surgical History:   Procedure Laterality Date    CORONARY ANGIOPLASTY WITH STENT PLACEMENT         Social History     Socioeconomic History    Marital status:    Tobacco Use    Smoking status: Never    Smokeless tobacco: Never   Substance and Sexual Activity    Alcohol use: Not Currently    Drug use: Never    Sexual activity: Not Currently     Social Determinants of Health     Financial Resource Strain: Low Risk  (7/28/2023)    Overall Financial Resource Strain (CARDIA)     Difficulty of Paying Living Expenses: Not hard at all   Food Insecurity: No Food Insecurity (7/28/2023)    Hunger Vital Sign     Worried About Running Out of Food in the Last Year: Never true     Ran Out of Food in the Last Year: Never true   Transportation Needs: No Transportation Needs (7/28/2023)    PRAPARE - Transportation     Lack of Transportation (Medical): No     Lack of Transportation (Non-Medical): No   Physical Activity: Inactive (7/28/2023)    Exercise Vital Sign     Days of Exercise per Week: 0 days     Minutes of Exercise per Session: 0 min   Stress: Unknown (7/28/2023)    [place] [employer] - " "Occupational Stress Questionnaire     Feeling of Stress : Patient refused   Social Connections: Unknown (7/28/2023)    Social Connection and Isolation Panel [NHANES]     Frequency of Communication with Friends and Family: More than three times a week     Frequency of Social Gatherings with Friends and Family: Once a week     Active Member of Clubs or Organizations: Yes     Attends Club or Organization Meetings: More than 4 times per year     Marital Status:    Housing Stability: Low Risk  (7/28/2023)    Housing Stability Vital Sign     Unable to Pay for Housing in the Last Year: No     Number of Places Lived in the Last Year: 1     Unstable Housing in the Last Year: No       No family history on file.    Review of patient's allergies indicates:   Allergen Reactions    Tetracyclines Swelling     Doesn't remember clearly, rxn to anything "cycline    Sutures, silk     Iron analogues Rash    Nylon Rash     Sores all along nylon stitches         Medications:  Continuous Infusions:  Scheduled Meds:   amLODIPine  5 mg Oral Daily    aspirin  81 mg Oral Daily    atorvastatin  20 mg Oral Daily    docusate sodium  100 mg Oral BID     PRN Meds:acetaminophen, glucose, glucose, melatonin, naloxone, ondansetron, prochlorperazine, sodium chloride 0.9%     Review of Systems  Objective:     Weight: 54.4 kg (120 lb)  Body mass index is 21.26 kg/m².  Vital Signs (Most Recent):  Temp: 98.9 °F (37.2 °C) (09/07/23 1112)  Pulse: 75 (09/07/23 1112)  Resp: 18 (09/07/23 1112)  BP: 135/60 (09/07/23 1112)  SpO2: 99 % (09/07/23 1112) Vital Signs (24h Range):  Temp:  [97 °F (36.1 °C)-98.9 °F (37.2 °C)] 98.9 °F (37.2 °C)  Pulse:  [] 75  Resp:  [16-20] 18  SpO2:  [96 %-100 %] 99 %  BP: (135-178)/() 135/60                                 Physical Exam         Neurosurgery Physical Exam    Physical Exam:    Constitutional: No distress.     HEENT: atraumatic/normocephalic    Cardiovascular: Regular rhythm.     Pulm: " "aerating well, saturating well    Abdominal: Soft.     Psych/Behavior: He is alert.     [id number]  Baseline confusion  PERRL  EOMI  Face Symmetric  Tongue midline  BUE 5/5  BLE 5/5  No drift      Significant Labs:  Recent Labs   Lab 09/07/23  0523         K 3.6      CO2 27   BUN 11   CREATININE 0.6   CALCIUM 9.5     Recent Labs   Lab 09/07/23  0523   WBC 8.78   HGB 9.3*   HCT 29.0*        No results for input(s): "LABPT", "INR", "APTT" in the last 48 hours.  Microbiology Results (last 7 days)       ** No results found for the last 168 hours. **          All pertinent labs from the last 24 hours have been reviewed.    Significant Diagnostics:  I have reviewed and interpreted all pertinent imaging results/findings within the past 24 hours.    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 No acute findings. No hemorrhage.     No neurosurgical intervention needed  No further imaging needed  Continue follow up as planned with neurosurgeon in [place] for CPA mass and aneurysm  NSGY will sign off at this time.         Tj Sarmiento MD  Neurosurgery  [facility] - Neurosurgery (Hospital)  "

## 2023-09-07 NOTE — SUBJECTIVE & OBJECTIVE
"Past Medical History:   Diagnosis Date    Hypertension     Traumatic hematoma of buttock 7/26/2023    Tumor     "in head"       Past Surgical History:   Procedure Laterality Date    CORONARY ANGIOPLASTY WITH STENT PLACEMENT         Social History     Socioeconomic History    Marital status:    Tobacco Use    Smoking status: Never    Smokeless tobacco: Never   Substance and Sexual Activity    Alcohol use: Not Currently    Drug use: Never    Sexual activity: Not Currently     Social Determinants of Health     Financial Resource Strain: Low Risk  (7/28/2023)    Overall Financial Resource Strain (CARDIA)     Difficulty of Paying Living Expenses: Not hard at all   Food Insecurity: No Food Insecurity (7/28/2023)    Hunger Vital Sign     Worried About Running Out of Food in the Last Year: Never true     Ran Out of Food in the Last Year: Never true   Transportation Needs: No Transportation Needs (7/28/2023)    PRAPARE - Transportation     Lack of Transportation (Medical): No     Lack of Transportation (Non-Medical): No   Physical Activity: Inactive (7/28/2023)    Exercise Vital Sign     Days of Exercise per Week: 0 days     Minutes of Exercise per Session: 0 min   Stress: Unknown (7/28/2023)    [employer] - Occupational Stress Questionnaire     Feeling of Stress : Patient refused   Social Connections: Unknown (7/28/2023)    Social Connection and Isolation Panel [NHANES]     Frequency of Communication with Friends and Family: More than three times a week     Frequency of Social Gatherings with Friends and Family: Once a week     Active Member of Clubs or Organizations: Yes     Attends Club or Organization Meetings: More than 4 times per year     Marital Status:    Housing Stability: Low Risk  (7/28/2023)    Housing Stability Vital Sign     Unable to Pay for Housing in the Last Year: No     Number of Places Lived in the Last Year: 1     Unstable Housing in the Last Year: No       No " "family history on file.    Review of patient's allergies indicates:   Allergen Reactions    Tetracyclines Swelling     Doesn't remember clearly, rxn to anything "cycline    Sutures, silk     Iron analogues Rash    Nylon Rash     Sores all along nylon stitches         Medications:  Continuous Infusions:  Scheduled Meds:   amLODIPine  5 mg Oral Daily    aspirin  81 mg Oral Daily    atorvastatin  20 mg Oral Daily    docusate sodium  100 mg Oral BID     PRN Meds:acetaminophen, glucose, glucose, melatonin, naloxone, ondansetron, prochlorperazine, sodium chloride 0.9%     Review of Systems  Objective:     Weight: 54.4 kg (120 lb)  Body mass index is 21.26 kg/m².  Vital Signs (Most Recent):  Temp: 98.9 °F (37.2 °C) (09/07/23 1112)  Pulse: 75 (09/07/23 1112)  Resp: 18 (09/07/23 1112)  BP: 135/60 (09/07/23 1112)  SpO2: 99 % (09/07/23 1112) Vital Signs (24h Range):  Temp:  [97 °F (36.1 °C)-98.9 °F (37.2 °C)] 98.9 °F (37.2 °C)  Pulse:  [] 75  Resp:  [16-20] 18  SpO2:  [96 %-100 %] 99 %  BP: (135-178)/() 135/60                                 Physical Exam         Neurosurgery Physical Exam    Physical Exam:    Constitutional: No distress.     HEENT: atraumatic/normocephalic    Cardiovascular: Regular rhythm.     Pulm: aerating well, saturating well    Abdominal: Soft.     Psych/Behavior: He is alert.     [id number]  Baseline confusion  PERRL  EOMI  Face Symmetric  Tongue midline  BUE 5/5  BLE 5/5  No drift      Significant Labs:  Recent Labs   Lab 09/07/23  0523         K 3.6      CO2 27   BUN 11   CREATININE 0.6   CALCIUM 9.5     Recent Labs   Lab 09/07/23  0523   WBC 8.78   HGB 9.3*   HCT 29.0*        No results for input(s): "LABPT", "INR", "APTT" in the last 48 hours.  Microbiology Results (last 7 days)       ** No results found for the last 168 hours. **          All pertinent labs from the last 24 hours have been reviewed.    Significant Diagnostics:  I have reviewed and interpreted all " pertinent imaging results/findings within the past 24 hours.

## 2023-09-07 NOTE — PT/OT/SLP EVAL
Physical Therapy Co-Evaluation and Co-Treatment with OT    Patient Name:  Ashley Mak   MRN:  [id number]    Recent Surgery: * No surgery found *      Patient required co-tx with OT secondary to need for multiple set of skilled hands to provide safest therapy and best outcomes.      Recommendations:     Discharge Recommendations:  nursing facility, skilled   Discharge Equipment Recommendations: to be determined by next level of care   Barriers to discharge: Increased level of assist    Highest Level of Mobility: Gait 12'  Assistance Required: Min(A) w/ RW    Assessment:     Ashley Mak is a 87 y.o. female admitted with a medical diagnosis of Episode of transient neurologic symptoms. She presents with the following impairments/functional limitations:  weakness, impaired endurance, impaired cognition, impaired coordination, decreased upper extremity function, impaired self care skills, impaired functional mobility, decreased lower extremity function, gait instability, decreased safety awareness, impaired balance, impaired cardiopulmonary response to activity    Pt met with HOB elevated, son present and agreeable to PT session. Pt is A&Ox1 and is unable to provide a hx. Pt's son reports her PLOF is mod(I) with use of either rollator or cane. He is unsure if cane is SPC or QC.  Currently, pt requires min(A) for ambulation using a RW. Pt had an episode of urinary incontinence during ambulation. She is a high fall risk at this time.     Pt would benefit from continued skilled acute PT 3x/wk to address above listed functional deficits, provide patient/caregiver education, reduce fall risk, and maximize (I) and safety with functional mobility. After hospital discharge, pt would benefit from SNF to maximize rehab potential.    Rehab Prognosis: Good; patient would benefit from acute skilled PT services to address these deficits and reach maximum level of function.      Plan:     During this hospitalization, patient to be seen 3  "x/week to address the identified rehab impairments via gait training, therapeutic activities, therapeutic exercises, neuromuscular re-education and progress toward the following goals:    Plan of Care Expires:  10/07/23    This plan of care has been discussed with the patient/caregiver, who was included in its development and is in agreement with the identified goals and treatment plan.     Subjective     Communicated with RN prior to session.  Patient agreeable to participate.     Chief Complaint: Transient neurologic symptoms, multiple falls at home  Patient/Family Comments/goals: None stated    Pain/Comfort:  Pain Rating 1: 0/10  Pain Rating Post-Intervention 1: 0/10    Patients cultural, spiritual, Catholic conflicts given the current situation: no    Patient's living environment is as follows:  Living Environment: Pt lives with daughter in [facility] with 0 SAVANNA. Bathroom set-up: tub/shower combo  Prior Level of Function: modified (I) for mobility and ADLs using cane or rollator as AD   DME used: rollator, cane, quad, cane, straight (pt's son is unsure if cane is SPC or QC)  DME owned (not currently used): none  Upon discharge, patient will have assistance from: Daughter, unsure of what level of assist she can provide    Objective:     Patient found HOB elevated with bed alarm, blood pressure cuff, telemetry, peripheral IV  upon PT entry to room.    General Precautions: Standard, fall   Orthopedic Precautions:N/A   Braces: N/A   /60 (BP Location: Left arm, Patient Position: Lying)   Pulse 75   Temp 98.9 °F (37.2 °C) (Oral)   Resp 18   Ht 5' 3" (1.6 m)   Wt 54.4 kg (120 lb)   SpO2 99%   BMI 21.26 kg/m²   Oxygen Device:  room air      Exams:    Cognition:  Patient is oriented to Person  Follows one-step commands  Insight to deficits/safety awareness: impaired    Edema: None present    Tone: None present    Postural examination/scapula alignment: Rounded shoulder and Head forward    Lower Extremity Range of " Motion:  Right Lower Extremity: WNL  Left Lower Extremity: WNL    Lower Extremity Strength    Right LE  Left LE    Hip Flexion: 4+/5 Hip Flexion: 4+/5   Knee Extension: 4+/5 Knee Extension: 4+/5   Knee Flexion: 4+/5 Knee Flexion: 4+/5   Ankle Dorsiflexion:  4+/5 Ankle Dorsiflexion: 4+/5   Ankle Plantarflexion: 4+/5 Ankle Plantarflexion: 4+/5        Sensation:   Light touch sensation: Intact BLEs    Functional Mobility:    Bed Mobility:  Supine to Sit: Minimal Assistance on R side of bed  Sit to Supine: Minimal Assistance  Scooting anteriorly to EOB to plant feet on floor: Minimal Assistance    Transfers:   Sit to Stand Transfer: Minimal Assistance  from EOB with RW AD   Bed to Chair: Minimal Assistance with RW AD via stand step t/f             Gait:  Patient received gait training 12 feet with Minimal Assistance and rolling walker  Gait Assessment: unsteady gait, narrow base of support, flexed posture, decreased shen, and shuffle gait  Gait Pattern Observed: Step-to, L lean  Comments: All lines remained intact throughout ambulation trial, gait belt utilized. PT provided tactile and verbal cues for improved RW management. Limited by an episode of urinary incontinence during ambulation. Pt assisted to chair where pericare and linen change were performed.      Balance:  Static Sit:   Initially SBA, then regressed to max(A)  at EOB  Posterior lean  Static Stand:   Min Assist with Rolling walker  Dynamic Stand:  Min Assist with Rolling walker      Therapeutic Activities/Exercises     Patient assisted with functional mobility as noted above  Discussed at length benefits of PT   Pt assisted with pericare and linen change after urinary incontinence   Patient educated on the importance of early mobility, OOB to prevent functional decline during hospital stay  Patient was instructed to utilize staff assistance for mobility/transfers.  Patient is appropriate to transfer with min(a) and RN/PCT assist  Patient educated on PT  "POC and role of PT in acute care  White board updated to include patient's safest level of mobility with staff assistance, RN also updated    AM-PAC 6 CLICK MOBILITY  Turning over in bed (including adjusting bedclothes, sheets and blankets)?: 3  Sitting down on and standing up from a chair with arms (e.g., wheelchair, bedside commode, etc.): 3  Moving from lying on back to sitting on the side of the bed?: 3  Moving to and from a bed to a chair (including a wheelchair)?: 3  Need to walk in hospital room?: 3  Climbing 3-5 steps with a railing?: 2  Basic Mobility Total Score: 17      Patient left HOB elevated with all lines intact, call button in reach, bed alarm on, RN notified, and son present.      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 Supine to sit with Stand-by Assistance  2. Sit to supine with Stand-by Assistance  3. Sit to stand transfer with Stand-by Assistance  4. Bed to chair transfer with Stand-by Assistance using LRAD as needed  5. Gait  x 150 feet with Stand-by Assistance using LRAD as needed.   6. Lower extremity exercise program x15 reps per handout, with assistance as needed                         Time Tracking:     PT Received On: 23  PT Start Time: 907     PT Stop Time: 944  PT Total Time (min): 37 min     Billable Minutes: Evaluation 10, Gait Training 15, and Therapeutic Activity 12      Tatiana Stuart, " PT  09/07/2023  Pager# [phone]

## 2023-09-07 NOTE — ASSESSMENT & PLAN NOTE
-Mass stable per CT read. Pt. Reported to have increased falls, worsening memory. May be related to dementia/debility, however rather than mass  -Neurosurgery consulted as above

## 2023-09-07 NOTE — H&P
[facility] - Neurosurgery ([facility])  [facility] Medicine  History & Physical    Patient Name: Ashley Mak  MRN: [id number]  Patient Class: OP- Observation  Admission Date: 9/6/2023  Attending Physician: Carly Gleason MD   Primary Care Provider: Sabra Burns MD         Patient information was obtained from patient, past medical records and ER records.     Subjective:     Principal Problem:Episode of transient neurologic symptoms    Chief Complaint: No chief complaint on file.       HPI: 88 y/o female with PMH of HTN and brain tumor who presented to [facility] yesterday after a fall with head trauma. Pt. Reportedly leaned back and hit her head on a piece of furniture. No reported loss of consciousness reported, and pt. Was awake on the way to the ED. She had a laceration which needed to be repaired but while this was being done in the ER she had a period of decreased responsiveness, nystagmus, and unable to answer questions that was concerning for seizure.  CT head showed: Scalp injury without evidence of an underlying skull fracture or intracranial hemorrhage. 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 CT C-spine negative for any fracture. Patient is now alert and conversant. There is concern for post concussive seizure. Case was discussed with neurosurgery and patient was transferred here for neurosurgery evaluation. At time of my assessment, pt. Is alert to person, place, and year, but she does not rememebr yesterday or today's events. She is unable to tell me about the fall and unable to tell me whether or not she ate anyting today. She does not have good recall of the events leading to transfer or her stay in the [place] ED. Daughter reports that the patient seemed to be improving, however, and suggests the patient may have worsening  "osbaldo problems over the last few months rather than just today. Pt. Has no acute complaints at time of my assessment.      Past Medical History:   Diagnosis Date    Hypertension     Tumor     "in head"       Past Surgical History:   Procedure Laterality Date    CORONARY ANGIOPLASTY WITH STENT PLACEMENT         Review of patient's allergies indicates:   Allergen Reactions    Tetracyclines Swelling     Doesn't remember clearly, rxn to anything "cycline    Sutures, silk     Iron analogues Rash    Nylon Rash     Sores all along nylon stitches       No current facility-administered medications on file prior to encounter.     Current Outpatient Medications on File Prior to Encounter   Medication Sig    acetaminophen (TYLENOL) 500 MG tablet Take 1,000 mg by mouth every 8 (eight) hours.    amLODIPine (NORVASC) 5 MG tablet Take 5 mg by mouth once daily.    aspirin (ECOTRIN) 81 MG EC tablet Take 81 mg by mouth once daily.    atorvastatin (LIPITOR) 20 MG tablet Take 1 tablet by mouth once daily.    calcium carbonate (TUMS) 200 mg calcium (500 mg) chewable tablet Take 500 mg by mouth once daily.    cholecalciferol, vitamin D3, (VITAMIN D3) 25 mcg (1,000 unit) capsule Take 1,000 Units by mouth.    denosumab (PROLIA) 60 mg/mL Syrg Inject 60 mg into the skin.    docusate sodium (COLACE) 100 MG capsule Take 100 mg by mouth 2 (two) times daily.    linaCLOtide (LINZESS) 145 mcg Cap capsule Take 145 mcg by mouth.    MULTIVITAMIN ORAL Take 1 tablet by mouth once daily.    polyethylene glycol (GLYCOLAX) 17 gram/dose powder Take 17 g by mouth.    sodium chloride (OCEAN) 0.65 % nasal spray 1 spray by Nasal route daily as needed.     Family History    None       Tobacco Use    Smoking status: Never    Smokeless tobacco: Never   Substance and Sexual Activity    Alcohol use: Not Currently    Drug use: Never    Sexual activity: Not Currently     Review of Systems   Constitutional:  Positive for activity change. " Negative for appetite change, chills, fever and unexpected weight change.   HENT:  Negative for congestion and sore throat.         Head trauma   Respiratory:  Negative for cough and shortness of breath.    Cardiovascular:  Negative for chest pain, palpitations and leg swelling.   Gastrointestinal:  Negative for abdominal distention, abdominal pain, blood in stool, constipation, diarrhea, nausea and vomiting.   Genitourinary:  Negative for difficulty urinating, dysuria and hematuria.   Musculoskeletal:  Positive for arthralgias. Negative for myalgias.   Skin:  Negative for color change and rash.   Neurological:  Positive for syncope. Negative for dizziness, tremors and seizures.   Psychiatric/Behavioral:  Positive for confusion.      Objective:     Vital Signs (Most Recent):  Temp: 98.5 °F (36.9 °C) (09/06/23 2145)  Pulse: 80 (09/06/23 2145)  Resp: 18 (09/06/23 2145)  BP: 136/64 (09/06/23 2145)  SpO2: 98 % (09/06/23 2145) Vital Signs (24h Range):  Temp:  [98.5 °F (36.9 °C)-98.7 °F (37.1 °C)] 98.5 °F (36.9 °C)  Pulse:  [] 80  Resp:  [16-18] 18  SpO2:  [97 %-100 %] 98 %  BP: (116-178)/() 136/64        There is no height or weight on file to calculate BMI.     Physical Exam  Vitals reviewed.   Constitutional:       General: She is not in acute distress.     Appearance: She is well-developed.   HENT:      Head: Normocephalic and atraumatic.   Eyes:      Extraocular Movements: Extraocular movements intact.      Pupils: Pupils are equal, round, and reactive to light.   Neck:      Vascular: No JVD.      Trachea: No tracheal deviation.   Cardiovascular:      Rate and Rhythm: Normal rate and regular rhythm.      Heart sounds: No murmur heard.     No friction rub. No gallop.   Pulmonary:      Effort: No respiratory distress.      Breath sounds: Normal breath sounds. No wheezing or rales.   Abdominal:      General: Bowel sounds are normal. There is no distension.      Palpations: Abdomen is soft. There is no mass.       Tenderness: There is no abdominal tenderness.   Musculoskeletal:         General: No deformity.      Cervical back: Neck supple.   Lymphadenopathy:      Cervical: No cervical adenopathy.   Skin:     General: Skin is warm and dry.      Findings: No rash.   Neurological:      Mental Status: She is alert. Mental status is at baseline. She is disoriented.      Comments: Pt. Unable to recall reason for admission, today's events              CRANIAL NERVES     CN III, IV, VI   Pupils are equal, round, and reactive to light.       Significant Labs: All pertinent labs within the past 24 hours have been reviewed.    Significant Imaging: I have reviewed all pertinent imaging results/findings within the past 24 hours.    Assessment/Plan:     * Episode of transient neurologic symptoms  -Pt. With alteration in conciousness occurring sometime after she had head trauma. Unclear cause, CT Head stable from prior  -Pt. Without any further episodes in last ~24hrs. Mentation seems to be returned near baseline although she is having short-term memory issues, daughter states these may be chronic  -Monitor in obs with seizure precautions ordered, neurosurgery consulted for evaluation      Debility  -Pt. 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 Pt. Reported to have increased falls, worsening memory. May be related to dementia/debility, however rather than mass  -Neurosurgery consulted as above      Primary hypertension  -Continue home amlodipine      CAD (coronary artery disease)  -Pt. with hx of stent, but no acute issues  -Continue home ASA and statin        VTE Risk Mitigation (From admission, onward)         Ordered     IP VTE HIGH RISK PATIENT  Once         09/06/23 2101     Place sequential compression device  Until discontinued         09/06/23 2101                    On 09/06/2023, patient should be placed in hospital observation services under my care.        Ra Gongora MD  Department of Hospital Medicine  [facility] - Neurosurgery ([facility])

## 2023-09-07 NOTE — SUBJECTIVE & OBJECTIVE
Interval History: Patient's son in the room with her, says I'm the first doctor to come to the room today. I informed them that Neurosurgery ordered MRI. Chart review shows she last had MRI done on 6/24/2023 in [place].    Review of Systems   Constitutional:  Negative for chills and fever.   Respiratory:  Negative for cough and shortness of breath.      Objective:     Vital Signs (Most Recent):  Temp: 98.9 °F (37.2 °C) (09/07/23 1112)  Pulse: 75 (09/07/23 1112)  Resp: 18 (09/07/23 1112)  BP: 135/60 (09/07/23 1112)  SpO2: 99 % (09/07/23 1112) Vital Signs (24h Range):  Temp:  [97 °F (36.1 °C)-98.9 °F (37.2 °C)] 98.9 °F (37.2 °C)  Pulse:  [] 75  Resp:  [16-20] 18  SpO2:  [96 %-100 %] 99 %  BP: (135-178)/() 135/60     Weight: 54.4 kg (120 lb)  Body mass index is 21.26 kg/m².  No intake or output data in the 24 hours ending 09/07/23 1311      Physical Exam  Vitals and nursing note reviewed.   Constitutional:       General: She is not in acute distress.     Appearance: She is well-developed and normal weight. She is not diaphoretic.   Pulmonary:      Effort: Pulmonary effort is normal. No respiratory distress.      Breath sounds: No stridor. No wheezing.   Neurological:      Mental Status: She is alert. Mental status is at baseline. She is disoriented.      Motor: No seizure activity.   Psychiatric:         Attention and Perception: Attention normal.         Mood and Affect: Mood and affect normal.         Behavior: Behavior is cooperative.         Cognition and Memory: Memory is impaired.             Significant Labs: All pertinent labs within the past 24 hours have been reviewed.    Significant Imaging: I have reviewed all pertinent imaging results/findings within the past 24 hours.

## 2023-09-07 NOTE — ASSESSMENT & PLAN NOTE
-Pt. With several recent falls, staff reports patient fell again in ED at [place]  -PT/OT consult for debility

## 2023-09-07 NOTE — PLAN OF CARE
Problem: Adult Inpatient Plan of Care  Goal: Plan of Care Review  Outcome: Ongoing, Progressing  Goal: Patient-Specific Goal (Individualized)  Outcome: Ongoing, Progressing  Goal: Absence of Hospital-Acquired Illness or Injury  Outcome: Ongoing, Progressing  Intervention: Identify and Manage Fall Risk  Flowsheets (Taken 9/7/2023 1211)  Safety Promotion/Fall Prevention:   assistive device/personal item within reach   bed alarm set   Fall Risk reviewed with patient/family   Fall Risk signage in place   lighting adjusted   nonskid shoes/socks when out of bed   instructed to call staff for mobility   medications reviewed   /camera at bedside  Intervention: Prevent Skin Injury  Flowsheets (Taken 9/7/2023 1211)  Body Position:   position changed independently   weight shifting  Skin Protection:   adhesive use limited   incontinence pads utilized  Intervention: Prevent and Manage VTE (Venous Thromboembolism) Risk  Flowsheets (Taken 9/7/2023 1211)  Activity Management: Rolling - L1  VTE Prevention/Management: ROM (active) performed  Range of Motion: active ROM (range of motion) encouraged  Intervention: Prevent Infection  Flowsheets (Taken 9/7/2023 1211)  Infection Prevention:   hand hygiene promoted   single patient room provided  Goal: Optimal Comfort and Wellbeing  Outcome: Ongoing, Progressing  Intervention: Monitor Pain and Promote Comfort  Flowsheets (Taken 9/7/2023 1211)  Pain Management Interventions:   care clustered   pillow support provided   position adjusted  Goal: Readiness for Transition of Care  Outcome: Ongoing, Progressing     Problem: Skin Injury Risk Increased  Goal: Skin Health and Integrity  Outcome: Ongoing, Progressing  Intervention: Optimize Skin Protection  Flowsheets (Taken 9/7/2023 1211)  Pressure Reduction Techniques: weight shift assistance provided  Pressure Reduction Devices:   positioning supports utilized   pressure-redistributing mattress utilized  Skin Protection:    adhesive use limited   incontinence pads utilized  Head of Bed (HOB) Positioning: HOB elevated     Problem: Fall Injury Risk  Goal: Absence of Fall and Fall-Related Injury  Outcome: Ongoing, Progressing  Intervention: Identify and Manage Contributors  Flowsheets (Taken 9/7/2023 1211)  Medication Review/Management: medications reviewed  Intervention: Promote Injury-Free Environment  Flowsheets (Taken 9/7/2023 1211)  Safety Promotion/Fall Prevention:   assistive device/personal item within reach   bed alarm set   Fall Risk reviewed with patient/family   Fall Risk signage in place   lighting adjusted   nonskid shoes/socks when out of bed   instructed to call staff for mobility   medications reviewed   /camera at bedside

## 2023-09-07 NOTE — HOSPITAL COURSE
She reported chronic cough when taking medications. Speech Language Pathology was consulted and recommended soft and bite-sized diet. Neurosurgery suspected she had a concussion and felt nothing else had to be done as her brain mass appeared to be stable. Physical and Occupational Therapy recommended skilled nursing facility. Her daughter reported that she was under the impression that her mother was transferred for evaluation by a neurologist for her memory loss, not the acute problem. Plan was discussed with her to get outpatient evaluation for dementia. Her daughter decided to take her home rather than a skilled nursing facility, where her memory loss could cause problems. Dementia workup was started with some labs, as she already had brain imaging done. RPR was non-reactive, folate and vitamin B12 levels were normal.

## 2023-09-07 NOTE — PROGRESS NOTES
[facility] - Neurosurgery ([facility])  [facility] Medicine  Progress Note    Patient Name: Ashley Mak  MRN: [id number]  Patient Class: OP- Observation   Admission Date: 9/6/2023  Length of Stay: 0 days  Attending Physician: Ras Fuentes MD  Primary Care Provider: Sabra Burns MD        Subjective:     Principal Problem:Episode of transient neurologic symptoms        HPI:  Ashley Mak is an 87 year old white woman with hypertension, coronary artery disease status post coronary angioplasty with stent placement, schwannoma, right cerebellopontine angle brain mass, 1 mm anterior communicating artery aneurysm, short-term memory loss. She lives in [place], [place]. She is . Her primary care physician is Dr. Sabra Burns in [place], [place].    She presented to [facility] on 9/5/2023 after a fall with head trauma. She had leaned back and hit her head on a piece of furniture. She had no loss of consciousness. She had been staying with her daughter for the past month, a visit that was longer than expected due to her daughter becoming ill and having to go to the hospital. She had a laceration that was repaired in the emergency department. While this was being done, she had a period of decreased responsiveness, nystagmus, and inability to answer questions, which was concerning for a post-concussive seizure. Head CT showed no change in her brain mass compared to 7/29/2023. Cervical spine CT showed no fracture. Her case was discussed with Neurosurgery at [facility]. She was transferred for Neurosurgery evaluation. She was transferred to [facility] on 9/6/2023 and admitted to [facility] Medicine Team N. She was oriented to person, place, and year but did not remember the events that led her to going to the hospital. Her daughter reported that she seemed to be improving but that she may have had worsening memory problems over the last few  months.       Overview/Hospital Course:  She reported chronic cough when taking medications. Speech Language Pathology was consulted. Neurosurgery ordered brain MRI with and without contrast.       Interval History: Patient's son in the room with her, says I'm the first doctor to come to the room today. I informed them that Neurosurgery ordered MRI. Chart review shows she last had MRI done on 6/24/2023 in [place].    Review of Systems   Constitutional:  Negative for chills and fever.   Respiratory:  Negative for cough and shortness of breath.      Objective:     Vital Signs (Most Recent):  Temp: 98.9 °F (37.2 °C) (09/07/23 1112)  Pulse: 75 (09/07/23 1112)  Resp: 18 (09/07/23 1112)  BP: 135/60 (09/07/23 1112)  SpO2: 99 % (09/07/23 1112) Vital Signs (24h Range):  Temp:  [97 °F (36.1 °C)-98.9 °F (37.2 °C)] 98.9 °F (37.2 °C)  Pulse:  [] 75  Resp:  [16-20] 18  SpO2:  [96 %-100 %] 99 %  BP: (135-178)/() 135/60     Weight: 54.4 kg (120 lb)  Body mass index is 21.26 kg/m².  No intake or output data in the 24 hours ending 09/07/23 1311      Physical Exam  Vitals and nursing note reviewed.   Constitutional:       General: She is not in acute distress.     Appearance: She is well-developed and normal weight. She is not diaphoretic.   Pulmonary:      Effort: Pulmonary effort is normal. No respiratory distress.      Breath sounds: No stridor. No wheezing.   Neurological:      Mental Status: She is alert. Mental status is at baseline. She is disoriented.      Motor: No seizure activity.   Psychiatric:         Attention and Perception: Attention normal.         Mood and Affect: Mood and affect normal.         Behavior: Behavior is cooperative.         Cognition and Memory: Memory is impaired.             Significant Labs: All pertinent labs within the past 24 hours have been reviewed.    Significant Imaging: I have reviewed all pertinent imaging results/findings within the past 24 hours.      Assessment/Plan:      *  Episode of transient neurologic symptoms  Unclear if related to chronic brain mass. Monitoring for any further episodes. None so far.    Short-term memory loss  Chronic for the past several months. Evaluate for dementia outpatient.    Debility  Physical and Occupational Therapy consulted.    Neoplasm of brain causing mass effect and brain compression on adjacent structures  Transferred here for Neurosurgery evaluation. Head CT showed stable mass. Neurosurgery getting MRI.    Primary hypertension  Continue home amlodipine. Monitor blood pressures.    CAD (coronary artery disease)  Continue home aspirin and atorvastatin.      VTE Risk Mitigation (From admission, onward)         Ordered     IP VTE HIGH RISK PATIENT  Once         09/06/23 2101     Place sequential compression device  Until discontinued         09/06/23 2101                Discharge Planning   ALYSE: 9/8/2023     Code Status: Full Code   Is the patient medically ready for discharge?:     Reason for patient still in hospital (select all that apply): Imaging and Consult recommendations\                     Ras Fuentes MD  Department of Hospital Medicine   [facility] - Neurosurgery ([facility])

## 2023-09-07 NOTE — ASSESSMENT & PLAN NOTE
Transferred here for Neurosurgery evaluation. Head CT showed stable mass. Neurosurgery getting MRI.

## 2023-09-07 NOTE — HPI
Ashley Mak is an 87 year old white woman with hypertension, coronary artery disease status post coronary angioplasty with stent placement, right cerebellopontine angle schwannoma, 1 mm anterior communicating artery aneurysm, short-term memory loss. She lives in [place], [place]. She is . Her primary care physician is Dr. Sabra Burns in [place], [place].    She presented to [facility] on 9/5/2023 after a fall with head trauma. She had leaned back and hit her head on a piece of furniture. She had no loss of consciousness. She had been staying with her daughter for the past month, a visit that was longer than expected due to her daughter becoming ill and having to go to the hospital. She had a laceration that was repaired in the emergency department. While this was being done, she had a period of decreased responsiveness, nystagmus, and inability to answer questions, which was concerning for a post-concussive seizure. Head CT showed no change in her brain mass compared to 7/29/2023. Cervical spine CT showed no fracture. Her case was discussed with Neurosurgery at [facility]. She was transferred for Neurosurgery evaluation. She was transferred to [facility] on 9/6/2023 and admitted to Hospital Medicine Team N. She was oriented to person, place, and year but did not remember the events that led her to going to the hospital. Her daughter reported that she seemed to be improving but that she may have had worsening memory problems over the last few months.

## 2023-09-07 NOTE — PLAN OF CARE
"[facility] - Neurosurgery ([facility])  Initial Discharge Assessment    SW met w/pt and pt's son, Neena, at bedside for Discharge Planning Assessment. Pt was not able to answer questions due to "my brain is tired" and pt's son had limited knowledge of pt's post-acute care. SW called pt's daughter, Misa, who was able to answer questions. Per Misa, pt lives in [place], [place], but has been staying with her in the [place] area for the past several weeks. Per Misa, they stay in a H with O SAVANNA. Per Misa, pt was independent w/ADLs and used a walker and cane for ambulation. Pt will have assistance from family upon discharge. All questions addressed. SW will follow for needs.    Primary Care Provider: Sabra Burns MD    Admission Diagnosis: Seizure after head injury [R56.1]    Admission Date: 9/6/2023  Expected Discharge Date: 9/8/2023         Payor: HU[employer] MANAGED MEDICARE / Plan: [employer] MEDICARE PPO / Product Type: Medicare Advantage /     Extended Emergency Contact Information  Primary Emergency Contact: Misa Alvarez  Address: [address] [place] [address]  Mobile Phone: [phone]  Relation: Daughter  Preferred language: English   needed? No    Discharge Plan A: (P) Skilled Nursing Facility  Discharge Plan B: (P) Home with family, Home Health      [facility] [address] [place] [address], [place] - [address]  [address]  [facility] [address]  Phone: [phone] Fax: [phone]      Initial Assessment (most recent)       Adult Discharge Assessment - 09/07/23 1525          Discharge Assessment    Assessment Type Discharge Planning Assessment     Confirmed/corrected address, phone number and insurance Yes     Confirmed Demographics Correct on Facesheet   Updated address and PCP    Source of Information patient;family     Does patient/caregiver understand observation status Yes     Communicated ALYSE with patient/caregiver Date not available/Unable to determine     " Reason For Admission Episode of transient neurologic symptoms     People in Home child(bryon), adult     Fac[facility] From: [facility]     Do you expect to return to your current living situation? Yes     Do you have help at home or someone to help you manage your care at home? Yes     Who are your caregiver(s) and their phone number(s)? Misa Alvarez (Daughter) [phone]     Prior to hospitilization cognitive status: Unable to Assess     Current cognitive status: Unable to Assess     Walking or Climbing Stairs ambulation difficulty, requires equipment     Dressing/Bathing --   Independent    Do you have any problems with: Errands/Grocery   Family assists    Home Layout Able to live on 1st floor     Equipment Currently Used at Home bedside commode;cane, straight;walker, standard     Readmission within 30 days? No     Patient currently being followed by outpatient case management? Unable to determine (comments)     Do you currently have service(s) that help you manage your care at home? Yes (P)      Name and Contact number of agency [facility] (P)      Is the pt/caregiver preference to resume services with current agency Yes (P)      Do you have any problems affording any of your prescribed medications? TBD (P)      Who is going to help you get home at discharge? Misa Alvarez (Daughter) [phone] (P)      How do you get to doctors appointments? family or friend will provide (P)      Are you on dialysis? No (P)      Do you take coumadin? No (P)      DME Needed Upon Discharge  other (see comments) (P)    TBD    Discharge Plan discussed with: Adult children;Patient (P)      Discharge Plan A Skilled Nursing Facility (P)      Discharge Plan B Home with family;Home Health (P)         OTHER    Name(s) of People in Home Misa Alvarez (Daughter) [phone] (P)                       PONCHO Jim, CSW    Case Management Department

## 2023-09-07 NOTE — ASSESSMENT & PLAN NOTE
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 No acute findings. No hemorrhage.     No neurosurgical intervention needed  No further imaging needed  Continue follow up as planned with neurosurgeon in [place] for CPA mass and aneurysm  NSGY will sign off at this time.

## 2023-09-07 NOTE — PLAN OF CARE
OT jacob completed, POC established  Problem: Occupational Therapy  Goal: Occupational Therapy Goal  Description: Goals to be met by: 10/7/23     Patient will increase functional independence with ADLs by performing:    UE Dressing with Stand-by Assistance.  LE Dressing with Stand-by Assistance.  Grooming while EOB with Stand-by Assistance.  Supine to sit with Supervision.  Step transfer with Contact Guard Assistance    Outcome: Ongoing, Progressing

## 2023-09-08 PROBLEM — S06.0XAA CEREBRAL CONCUSSION: Status: ACTIVE | Noted: 2023-09-06

## 2023-09-08 LAB
ALBUMIN SERPL BCP-MCNC: 3 G/DL (ref 3.5–5.2)
ALP SERPL-CCNC: 63 U/L (ref 55–135)
ALT SERPL W/O P-5'-P-CCNC: 14 U/L (ref 10–44)
ANION GAP SERPL CALC-SCNC: 7 MMOL/L (ref 8–16)
AST SERPL-CCNC: 14 U/L (ref 10–40)
BASOPHILS # BLD AUTO: 0.03 K/UL (ref 0–0.2)
BASOPHILS NFR BLD: 0.5 % (ref 0–1.9)
BILIRUB SERPL-MCNC: 0.3 MG/DL (ref 0.1–1)
BUN SERPL-MCNC: 16 MG/DL (ref 8–23)
CALCIUM SERPL-MCNC: 10.1 MG/DL (ref 8.7–10.5)
CHLORIDE SERPL-SCNC: 106 MMOL/L (ref 95–110)
CO2 SERPL-SCNC: 28 MMOL/L (ref 23–29)
CREAT SERPL-MCNC: 0.6 MG/DL (ref 0.5–1.4)
DIFFERENTIAL METHOD: ABNORMAL
EOSINOPHIL # BLD AUTO: 0.2 K/UL (ref 0–0.5)
EOSINOPHIL NFR BLD: 3.8 % (ref 0–8)
ERYTHROCYTE [DISTWIDTH] IN BLOOD BY AUTOMATED COUNT: 12.5 % (ref 11.5–14.5)
EST. GFR  (NO RACE VARIABLE): >60 ML/MIN/1.73 M^2
FOLATE SERPL-MCNC: 12 NG/ML (ref 4–24)
GLUCOSE SERPL-MCNC: 100 MG/DL (ref 70–110)
HCT VFR BLD AUTO: 27.3 % (ref 37–48.5)
HGB BLD-MCNC: 8.7 G/DL (ref 12–16)
IMM GRANULOCYTES # BLD AUTO: 0.03 K/UL (ref 0–0.04)
IMM GRANULOCYTES NFR BLD AUTO: 0.5 % (ref 0–0.5)
LYMPHOCYTES # BLD AUTO: 0.9 K/UL (ref 1–4.8)
LYMPHOCYTES NFR BLD: 15.3 % (ref 18–48)
MCH RBC QN AUTO: 31.5 PG (ref 27–31)
MCHC RBC AUTO-ENTMCNC: 31.9 G/DL (ref 32–36)
MCV RBC AUTO: 99 FL (ref 82–98)
MONOCYTES # BLD AUTO: 0.4 K/UL (ref 0.3–1)
MONOCYTES NFR BLD: 6.9 % (ref 4–15)
NEUTROPHILS # BLD AUTO: 4.4 K/UL (ref 1.8–7.7)
NEUTROPHILS NFR BLD: 73 % (ref 38–73)
NRBC BLD-RTO: 0 /100 WBC
PLATELET # BLD AUTO: 167 K/UL (ref 150–450)
PMV BLD AUTO: 11.5 FL (ref 9.2–12.9)
POTASSIUM SERPL-SCNC: 3.8 MMOL/L (ref 3.5–5.1)
PROT SERPL-MCNC: 4.9 G/DL (ref 6–8.4)
RBC # BLD AUTO: 2.76 M/UL (ref 4–5.4)
SARS-COV-2 RNA RESP QL NAA+PROBE: NOT DETECTED
SODIUM SERPL-SCNC: 141 MMOL/L (ref 136–145)
VIT B12 SERPL-MCNC: 395 PG/ML (ref 210–950)
WBC # BLD AUTO: 6.08 K/UL (ref 3.9–12.7)

## 2023-09-08 PROCEDURE — 36415 COLL VENOUS BLD VENIPUNCTURE: CPT | Performed by: HOSPITALIST

## 2023-09-08 PROCEDURE — 99233 SBSQ HOSP IP/OBS HIGH 50: CPT | Mod: ,,, | Performed by: HOSPITALIST

## 2023-09-08 PROCEDURE — 97112 NEUROMUSCULAR REEDUCATION: CPT

## 2023-09-08 PROCEDURE — 97535 SELF CARE MNGMENT TRAINING: CPT

## 2023-09-08 PROCEDURE — 86580 TB INTRADERMAL TEST: CPT | Performed by: HOSPITALIST

## 2023-09-08 PROCEDURE — 92523 SPEECH SOUND LANG COMPREHEN: CPT

## 2023-09-08 PROCEDURE — 87635 SARS-COV-2 COVID-19 AMP PRB: CPT | Performed by: HOSPITALIST

## 2023-09-08 PROCEDURE — 82607 VITAMIN B-12: CPT | Performed by: HOSPITALIST

## 2023-09-08 PROCEDURE — G0378 HOSPITAL OBSERVATION PER HR: HCPCS

## 2023-09-08 PROCEDURE — 99233 PR SUBSEQUENT HOSPITAL CARE,LEVL III: ICD-10-PCS | Mod: ,,, | Performed by: HOSPITALIST

## 2023-09-08 PROCEDURE — 80053 COMPREHEN METABOLIC PANEL: CPT | Performed by: HOSPITALIST

## 2023-09-08 PROCEDURE — 86592 SYPHILIS TEST NON-TREP QUAL: CPT | Performed by: HOSPITALIST

## 2023-09-08 PROCEDURE — 85025 COMPLETE CBC W/AUTO DIFF WBC: CPT | Performed by: HOSPITALIST

## 2023-09-08 PROCEDURE — 92610 EVALUATE SWALLOWING FUNCTION: CPT

## 2023-09-08 PROCEDURE — 25000003 PHARM REV CODE 250: Performed by: HOSPITALIST

## 2023-09-08 PROCEDURE — 30200315 PPD INTRADERMAL TEST REV CODE 302: Performed by: HOSPITALIST

## 2023-09-08 PROCEDURE — 82746 ASSAY OF FOLIC ACID SERUM: CPT | Performed by: HOSPITALIST

## 2023-09-08 PROCEDURE — 97110 THERAPEUTIC EXERCISES: CPT

## 2023-09-08 RX ORDER — BISACODYL 5 MG
5 TABLET, DELAYED RELEASE (ENTERIC COATED) ORAL DAILY PRN
Status: DISCONTINUED | OUTPATIENT
Start: 2023-09-08 | End: 2023-09-09 | Stop reason: HOSPADM

## 2023-09-08 RX ADMIN — DOCUSATE SODIUM 100 MG: 100 CAPSULE, LIQUID FILLED ORAL at 09:09

## 2023-09-08 RX ADMIN — ATORVASTATIN CALCIUM 20 MG: 20 TABLET, FILM COATED ORAL at 09:09

## 2023-09-08 RX ADMIN — ASPIRIN 81 MG: 81 TABLET, COATED ORAL at 09:09

## 2023-09-08 RX ADMIN — BISACODYL 5 MG: 5 TABLET, COATED ORAL at 05:09

## 2023-09-08 RX ADMIN — TUBERCULIN PURIFIED PROTEIN DERIVATIVE 5 UNITS: 5 INJECTION, SOLUTION INTRADERMAL at 03:09

## 2023-09-08 RX ADMIN — Medication 6 MG: at 08:09

## 2023-09-08 RX ADMIN — DOCUSATE SODIUM 100 MG: 100 CAPSULE, LIQUID FILLED ORAL at 08:09

## 2023-09-08 RX ADMIN — AMLODIPINE BESYLATE 5 MG: 5 TABLET ORAL at 09:09

## 2023-09-08 NOTE — PLAN OF CARE
Problem: Adult Inpatient Plan of Care  Goal: Plan of Care Review  Outcome: Ongoing, Progressing  Goal: Patient-Specific Goal (Individualized)  Outcome: Ongoing, Progressing  Goal: Absence of Hospital-Acquired Illness or Injury  Outcome: Ongoing, Progressing  Intervention: Identify and Manage Fall Risk  Flowsheets (Taken 9/8/2023 1233)  Safety Promotion/Fall Prevention:   assistive device/personal item within reach   Fall Risk reviewed with patient/family   Fall Risk signage in place   nonskid shoes/socks when out of bed   lighting adjusted   medications reviewed   instructed to call staff for mobility   side rails raised x 3   /camera at bedside   bed alarm set  Intervention: Prevent Skin Injury  Flowsheets (Taken 9/8/2023 1233)  Skin Protection:   adhesive use limited   incontinence pads utilized  Intervention: Prevent and Manage VTE (Venous Thromboembolism) Risk  Flowsheets (Taken 9/8/2023 1233)  Activity Management:   Ambulated -L4   Rolling - L1   Seated marching - L2  VTE Prevention/Management:   dorsiflexion/plantar flexion performed   ROM (active) performed  Range of Motion: active ROM (range of motion) encouraged  Intervention: Prevent Infection  Flowsheets (Taken 9/8/2023 1233)  Infection Prevention:   hand hygiene promoted   single patient room provided  Goal: Optimal Comfort and Wellbeing  Outcome: Ongoing, Progressing  Intervention: Monitor Pain and Promote Comfort  Flowsheets (Taken 9/8/2023 1233)  Pain Management Interventions:   care clustered   pillow support provided   medication offered  Intervention: Provide Person-Centered Care  Flowsheets (Taken 9/8/2023 1233)  Trust Relationship/Rapport: questions answered  Goal: Readiness for Transition of Care  Outcome: Ongoing, Progressing     Problem: Skin Injury Risk Increased  Goal: Skin Health and Integrity  Outcome: Ongoing, Progressing  Intervention: Optimize Skin Protection  Flowsheets (Taken 9/8/2023 1233)  Pressure Reduction  Techniques:   frequent weight shift encouraged   weight shift assistance provided  Pressure Reduction Devices:   positioning supports utilized   pressure-redistributing mattress utilized  Skin Protection:   adhesive use limited   incontinence pads utilized  Head of Bed (HOB) Positioning: HOB elevated     Problem: Fall Injury Risk  Goal: Absence of Fall and Fall-Related Injury  Outcome: Ongoing, Progressing  Intervention: Identify and Manage Contributors  Flowsheets (Taken 9/8/2023 1233)  Self-Care Promotion: meal set-up provided  Medication Review/Management: medications reviewed  Intervention: Promote Injury-Free Environment  Flowsheets (Taken 9/8/2023 1233)  Safety Promotion/Fall Prevention:   assistive device/personal item within reach   Fall Risk reviewed with patient/family   Fall Risk signage in place   nonskid shoes/socks when out of bed   lighting adjusted   medications reviewed   instructed to call staff for mobility   side rails raised x 3   /camera at bedside   bed alarm set     Patient is pleasantly confused, needing frequent reminders to stay in the bed. Patient was seen by SLP today who recommends minimizing distractions during mealtime, and soft minced meats. Patient was also able to work with OT today. Patients vss, and patient is voiding using the purewick. Patient does have a cough but O2 sats have remained good and hasnt needed any supplemental oxygen. Rn will continue to monitor patient

## 2023-09-08 NOTE — PT/OT/SLP PROGRESS
Occupational Therapy   Treatment    Name: Ashley Mak  MRN: [id number]  Admitting Diagnosis:  Cerebral concussion       Recommendations:     Discharge Recommendations: nursing facility, skilled  Discharge Equipment Recommendations:  to be determined by next level of care  Barriers to discharge:  Other (Comment) (increased skilled A required)    Assessment:     Ashley Mak is a 87 y.o. female with a medical diagnosis of Cerebral concussion.  She presents with good participation and motivation. She was not oriented to situation, and location (independently. Able to identify type of building with choices). Performance deficits affecting function are weakness, impaired functional mobility, impaired cognition, decreased safety awareness, impaired coordination, impaired endurance, impaired balance, decreased upper extremity function, impaired self care skills, decreased lower extremity function, impaired fine motor.     Pt sat EOB for UB exercises as discussed below and static/dynamic sitting activities. She presents with a posterior lean with an occasional L side lean. She req max VC and tactile cues to adjust posture. Pt stated being aware of her leaning. She completed EOB LUE midline crossing reaches to the R side, requiring min-mod A with trunk stability. Pt would continue to benefit from skilled OT services to maximize functional independence with ADLs and functional mobility, reduce caregiver burden, and facilitate safe discharge in the least restrictive environment. OT recommending SNF once medically appropriate for discharge.     Rehab Prognosis:  Good; patient would benefit from acute skilled OT services to address these deficits and reach maximum level of function.       Plan:     Patient to be seen 3 x/week to address the above listed problems via self-care/home management, therapeutic activities, therapeutic exercises, neuromuscular re-education  Plan of Care Expires: 10/07/23  Plan of Care Reviewed with:  patient, son    Subjective     Chief Complaint: N/A   Patient/Family Comments/goals: prema my L from my R  Pain/Comfort:  Pain Rating 1: 0/10  Pain Rating Post-Intervention 1:  (not rated)    Objective:     Communicated with: RN prior to session.  Patient found HOB elevated with bed alarm, blood pressure cuff, peripheral IV, telemetry upon OT entry to room.    General Precautions: Standard, fall, aspiration    Orthopedic Precautions:N/A  Braces: N/A  Respiratory Status: Room air     Occupational Performance:     Bed Mobility:    Patient completed Scooting EOB with stand by assistance and hand held assist.  Scooting/bridging HOW with stand by assist  Patient completed Supine to Sit with minimum assistance - trunk mgmt  Patient completed Sit to Supine with supervision     Functional Mobility/Transfers:  Patient completed Sit <> Stand Transfer with stand by assistance  with  no assistive device - pt completed impulsively  Functional Mobility: NT        [facility] 6 Click ADL: 11    Treatment & Education:  -CCC held with Laila UG  -SHLDR ROM: flex/ext/abd/add (1x5)  -UE EXERCISES: bicep curls, horizontal elbow flexion, shldr press (1x10ea)  -Education on energy conservation and task modification to maximize safety and (I) during ADLs and mobility  -Education on importance of OOB activity to improve overall activity tolerance and promote recovery  -Pt educated to call for assistance and to transfer with hospital staff only  -Provided education regarding role of OT & discharge recommendations with pt verbalizing understanding.  Pt had no further questions & when asked whether there were any concerns pt reported none.      Patient left HOB elevated with all lines intact, call button in reach, bed alarm on, and RN notified    GOALS:   Multidisciplinary Problems       Occupational Therapy Goals          Problem: Occupational Therapy    Goal Priority Disciplines Outcome Interventions   Occupational Therapy Goal     OT, PT/OT  Ongoing, Progressing    Description: Goals to be met by: 10/7/23     Patient will increase functional independence with ADLs by performing:    UE Dressing with Stand-by Assistance.  LE Dressing with Stand-by Assistance.  Grooming while EOB with Stand-by Assistance.  Supine to sit with Supervision.  Step transfer with Contact Guard Assistance                         Time Tracking:     OT Date of Treatment: 09/08/23  OT Start Time: 0912  OT Stop Time: 0936  OT Total Time (min): 24 min    Billable Minutes:Therapeutic Exercise 12  Neuromuscular Re-education 12    OT/CARLOS: OT          9/8/2023

## 2023-09-08 NOTE — PLAN OF CARE
Problem: SLP  Goal: SLP Goal  Description: Speech Language Pathology Goals  Goals expected to be met by 9/22  1. Pt will tolerate regular diet with thin liquids without overt s/s aspiration.   Outcome: Ongoing, Progressing    Evaluation and follow up assessment completed.  Nurse reports multiple incidences of coughing with thin liquids, medications, and meals.  Although no overt s/s aspiration noted with SLP evaluation, pt did appear easily distracted with slowed oral transit and intermittent delayed swallow initiation, increasing aspiration risk.  Rec downgrade diet to level 6 soft and bite sized with continued thin liquids with meds buried in puree.  Please decreased environmental stimuli during meals.

## 2023-09-08 NOTE — ASSESSMENT & PLAN NOTE
Schwannoma  Transferred here for Neurosurgery evaluation. Nothing to do acutely. Continue monitoring in clinic.

## 2023-09-08 NOTE — SUBJECTIVE & OBJECTIVE
Interval History: Neurosurgery said follow up with as she has been for her brain mass.     Review of Systems   Constitutional:  Negative for chills and fever.   Respiratory:  Negative for cough and shortness of breath.      Objective:     Vital Signs (Most Recent):  Temp: 98.5 °F (36.9 °C) (09/08/23 0753)  Pulse: 70 (09/08/23 0753)  Resp: (!) 22 (09/08/23 0753)  BP: (!) 179/81 (09/08/23 0753)  SpO2: 97 % (09/08/23 0753) Vital Signs (24h Range):  Temp:  [98 °F (36.7 °C)-98.9 °F (37.2 °C)] 98.5 °F (36.9 °C)  Pulse:  [69-76] 70  Resp:  [16-22] 22  SpO2:  [95 %-99 %] 97 %  BP: (123-179)/(60-81) 179/81     Weight: 54.4 kg (120 lb)  Body mass index is 21.26 kg/m².  No intake or output data in the 24 hours ending 09/08/23 0902      Physical Exam  Vitals and nursing note reviewed.   Constitutional:       General: She is not in acute distress.     Appearance: She is well-developed and normal weight. She is not diaphoretic.   Pulmonary:      Effort: Pulmonary effort is normal. No respiratory distress.      Breath sounds: No stridor. No wheezing.   Neurological:      Mental Status: She is alert. Mental status is at baseline. She is disoriented.      Motor: No seizure activity.   Psychiatric:         Attention and Perception: Attention normal.         Mood and Affect: Mood and affect normal.         Behavior: Behavior is cooperative.         Cognition and Memory: Memory is impaired.             Significant Labs: All pertinent labs within the past 24 hours have been reviewed.    Significant Imaging: I have reviewed all pertinent imaging results/findings within the past 24 hours.

## 2023-09-08 NOTE — PT/OT/SLP EVAL
"Speech Language Pathology Evaluation  Bedside Swallow    Patient Name:  Ashley Mak   MRN:  [id number]  Admitting Diagnosis: Cerebral concussion    Recommendations:                 General Recommendations:   Diet follow up  Diet recommendations:  Soft & Bite Sized Diet - IDDSI Level 6, Thin liquids - IDDSI Level 0   Aspiration Precautions: 1 bite/sip at a time, Alternating bites/sips, Avoid talking while eating, Eliminate distractions, Feed only when awake/alert, HOB to 90 degrees, Meds whole buried in puree, Small bites/sips, and Strict aspiration precautions   General Precautions: Standard, aspiration, fall  Communication strategies:  provide increased time to answer and go to room if call light pushed    Assessment:     Ashley Mak is a 87 y.o. female with an SLP diagnosis of  mild dysphagia .  She presents with decreased sustained attention to task with prolonged mastication increasing aspiration risk.    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  She denied h/o dysphagia and endorses a regular diet with thin liquids at baseline.  Later, pt reported previous follow up with GI though was unable to clarify any previous esophageal issues.     Prior Intubation HX:  none this admission     Modified Barium Swallow: none reported     Chest X-Rays: no new    Subjective     Nurse cleared for evaluation, reports coughing with meds, thins, and meals yesterday.  Pt alert and cooperative.  SLP returned to pt's room for 2nd session at nurse's request 2/2 pt coughing with breakfast.       Pain/Comfort:  Pain Rating 1: 0/10  Pain Rating Post-Intervention 1: 0/10    Respiratory Status: Room air    Objective:     Oral Musculature Evaluation  Oral Musculature:  (some mild generalized lingual " weakness)  Dentition: present and adequate  Secretion Management: adequate  Mucosal Quality: adequate  Mandibular Strength and Mobility: WFL  Oral Labial Strength and Mobility: functional seal  Lingual Strength and Mobility: impaired strength  Volitional Cough: WFL-coughign lasted several minutes, coarse and harsh sounding  Volitional Swallow: WFL  Voice Prior to PO Intake: clear    Bedside Swallow Eval:   Consistencies Assessed:  Thin liquids   Puree    Solids       Oral Phase:   Prolonged mastication-mild    Pharyngeal Phase:   delayed swallow initation-mild    Compensatory Strategies  None    Treatment: During initial evaluation, education provided to pt and son re: role of SLP, diet recs, swallow precs, aspiration risk, and POC.  Both verbalized understanding.  SLP returned to pt's room later in am at nurse's request.  Pt sitting upright, alert and cooperative.  Regular breakfast tray repositioned in front of pt to allow for self feeding.  Pt noticeably distracted by television with slow presentation of cup and decreased attention to feeding task.  Television turned off and solids chopped for safety.  Pt required min cues to clear oral cavity between presentations for safety.  No overt s/s aspiration.  Education again provided to pt re: diet recs, aspiration risk, swallow precs, and POC.  Pt verbalized some agreement though education to be ongoing.       Goals:   Multidisciplinary Problems       SLP Goals          Problem: SLP    Goal Priority Disciplines Outcome   SLP Goal     SLP Ongoing, Progressing   Description: Speech Language Pathology Goals  Goals expected to be met by 9/22  1. Pt will tolerate regular diet with thin liquids without overt s/s aspiration.                                Plan:     Patient to be seen:  3 x/week   Plan of Care expires:  10/08/23  Plan of Care reviewed with:  patient, son   SLP Follow-Up:  Yes       Discharge recommendations:  nursing facility, skilled   Barriers to Discharge:   Level of Skilled Assistance Needed      Time Tracking:     SLP Treatment Date:   09/08/23  Speech Start Time:  0658 (756)  Speech Stop Time:  0709 (809)     Speech Total Time (min):  11 min +13 mins=  24 mins     Billable Minutes: Eval 8 , Treatment Swallowing Dysfunction 8, and Self Care/Home Management Training 8    09/08/2023

## 2023-09-08 NOTE — PROGRESS NOTES
[facility]ron Mahmood - Neurosurgery ([facility])  [facility] Medicine  Progress Note    Patient Name: Ashley Mak  MRN: [id number]  Patient Class: OP- Observation   Admission Date: 9/6/2023  Length of Stay: 0 days  Attending Physician: Ras Fuentes MD  Primary Care Provider: Sabra Burns MD        Subjective:     Principal Problem:Cerebral concussion        HPI:  Ashley Mak is an 87 year old white woman with hypertension, coronary artery disease status post coronary angioplasty with stent placement, right cerebellopontine angle schwannoma, 1 mm anterior communicating artery aneurysm, short-term memory loss. She lives in [place], [place]. She is . Her primary care physician is Dr. Sabra Burns in [place], [place].    She presented to [facility] on 9/5/2023 after a fall with head trauma. She had leaned back and hit her head on a piece of furniture. She had no loss of consciousness. She had been staying with her daughter for the past month, a visit that was longer than expected due to her daughter becoming ill and having to go to the hospital. She had a laceration that was repaired in the emergency department. While this was being done, she had a period of decreased responsiveness, nystagmus, and inability to answer questions, which was concerning for a post-concussive seizure. Head CT showed no change in her brain mass compared to 7/29/2023. Cervical spine CT showed no fracture. Her case was discussed with Neurosurgery at [facility]. She was transferred for Neurosurgery evaluation. She was transferred to [facility] on 9/6/2023 and admitted to [facility] Medicine Team N. She was oriented to person, place, and year but did not remember the events that led her to going to the hospital. Her daughter reported that she seemed to be improving but that she may have had worsening memory problems over the last few months.       Overview/Hospital  Course:  She reported chronic cough when taking medications. Speech Language Pathology was consulted. Neurosurgery suspected she had a concussion and felt nothing else had to be done as her brain mass appeared to be stable. Physical and Occupational Therapy recommended skilled nursing facility.       Interval History: Neurosurgery said follow up with as she has been for her brain mass.     Review of Systems   Constitutional:  Negative for chills and fever.   Respiratory:  Negative for cough and shortness of breath.      Objective:     Vital Signs (Most Recent):  Temp: 98.5 °F (36.9 °C) (09/08/23 0753)  Pulse: 70 (09/08/23 0753)  Resp: (!) 22 (09/08/23 0753)  BP: (!) 179/81 (09/08/23 0753)  SpO2: 97 % (09/08/23 0753) Vital Signs (24h Range):  Temp:  [98 °F (36.7 °C)-98.9 °F (37.2 °C)] 98.5 °F (36.9 °C)  Pulse:  [69-76] 70  Resp:  [16-22] 22  SpO2:  [95 %-99 %] 97 %  BP: (123-179)/(60-81) 179/81     Weight: 54.4 kg (120 lb)  Body mass index is 21.26 kg/m².  No intake or output data in the 24 hours ending 09/08/23 0902      Physical Exam  Vitals and nursing note reviewed.   Constitutional:       General: She is not in acute distress.     Appearance: She is well-developed and normal weight. She is not diaphoretic.   Pulmonary:      Effort: Pulmonary effort is normal. No respiratory distress.      Breath sounds: No stridor. No wheezing.   Neurological:      Mental Status: She is alert. Mental status is at baseline. She is disoriented.      Motor: No seizure activity.   Psychiatric:         Attention and Perception: Attention normal.         Mood and Affect: Mood and affect normal.         Behavior: Behavior is cooperative.         Cognition and Memory: Memory is impaired.             Significant Labs: All pertinent labs within the past 24 hours have been reviewed.    Significant Imaging: I have reviewed all pertinent imaging results/findings within the past 24 hours.      Assessment/Plan:      * Cerebral  concussion  Appreciate Neurosurgery. Observed. Nothing else to do.    Short-term memory loss  Chronic for the past several months. Evaluate for dementia outpatient.    Debility  Physical and Occupational Therapy. Skilled nursing facility.    Neoplasm of brain causing mass effect and brain compression on adjacent structures  Schwannoma  Transferred here for Neurosurgery evaluation. Nothing to do acutely. Continue monitoring in clinic.    Primary hypertension  Continue home amlodipine. Monitor blood pressures.    CAD (coronary artery disease)  Continue home aspirin and atorvastatin.      VTE Risk Mitigation (From admission, onward)         Ordered     IP VTE HIGH RISK PATIENT  Once         09/06/23 2101     Place sequential compression device  Until discontinued         09/06/23 2101                Discharge Planning   ALYSE: 9/9/2023     Code Status: Full Code   Is the patient medically ready for discharge?: Yes    Reason for patient still in hospital (select all that apply): Pending disposition  Discharge Plan A: Skilled Nursing Facility                  Ras Fuentes MD  Department of Hospital Medicine   [facility] - Neurosurgery ([facility])

## 2023-09-08 NOTE — PLAN OF CARE
09/08/23 1251   Post-Acute Status   Post-Acute Authorization Placement   Post-Acute Placement Status Referrals Sent   Patient choice form signed by patient/caregiver List with quality metrics by geographic area provided   Discharge Delays None known at this time   Discharge Plan   Discharge Plan A Skilled Nursing Facility   Discharge Plan B Home with family     Met with pt/family to review discharge recommendation of SNF and is agreeable to plan.    Patient/family provided list of facilities in-network with patient's payor plan. Providers that are owned, operated, or affiliated with [facility] are included on the list.     Notified that referral sent to below listed facilities from in-network list based on proximity to home/family support:     1. [facility]  2. [facility]  3. [facility]  4. [facility]  5. [facility]  6. [facility]  7. [facility]    Patient/family instructed to identify preference. Pt/family does not have a preference.    Preferred Facility: (if more than 1, listed in order of descending preference)  1.    If an additional preferred facility not listed above is identified, additional referral to be sent. If above facilities unable to accept, will send additional referrals to in-network providers.     1030  SW informed by [facility] that they will accept pt and will need MAR by 3:30pm.     1140  SW faxed PASRR for SNF placement.     1302  SW uploaded PASRR and 142 to [facility]. SW submitted for [employer] auth via [employer]. Updated pt/family. Will continue to follow for needs.    1351  SW called [facility] to speak with admissions regarding placement. Left  for callback.    1733  MELVIN met w/pt and daughter, Misa, at bedside to give update  regarding SNF placement. Informed pt/family that [employer] auth was still pending and facility had not called me back yet. Pt/family understands obs status. Observed telesitter in room and explained that it is a barrier to d/c to any facility. Per Misa, she make take pt home vs waiting for SNF approval/admittance as pt already has home health with PT. Per Misa, she wanted to know what the results for pt's MRI was and discuss with MD prior to making a final decision for d/c to home. MD team made aware. Will continue to follow for needs.      Soni Cabrera, PONCHO, CSW    Case Management Department

## 2023-09-09 VITALS
TEMPERATURE: 99 F | DIASTOLIC BLOOD PRESSURE: 58 MMHG | HEART RATE: 71 BPM | OXYGEN SATURATION: 96 % | SYSTOLIC BLOOD PRESSURE: 120 MMHG | RESPIRATION RATE: 15 BRPM | BODY MASS INDEX: 21.26 KG/M2 | HEIGHT: 63 IN | WEIGHT: 120 LBS

## 2023-09-09 LAB
ALBUMIN SERPL BCP-MCNC: 3 G/DL (ref 3.5–5.2)
ALP SERPL-CCNC: 67 U/L (ref 55–135)
ALT SERPL W/O P-5'-P-CCNC: 13 U/L (ref 10–44)
ANION GAP SERPL CALC-SCNC: 10 MMOL/L (ref 8–16)
AST SERPL-CCNC: 17 U/L (ref 10–40)
BASOPHILS # BLD AUTO: 0.03 K/UL (ref 0–0.2)
BASOPHILS NFR BLD: 0.5 % (ref 0–1.9)
BILIRUB SERPL-MCNC: 0.3 MG/DL (ref 0.1–1)
BUN SERPL-MCNC: 14 MG/DL (ref 8–23)
CALCIUM SERPL-MCNC: 10.1 MG/DL (ref 8.7–10.5)
CHLORIDE SERPL-SCNC: 105 MMOL/L (ref 95–110)
CO2 SERPL-SCNC: 25 MMOL/L (ref 23–29)
CREAT SERPL-MCNC: 0.6 MG/DL (ref 0.5–1.4)
DIFFERENTIAL METHOD: ABNORMAL
EOSINOPHIL # BLD AUTO: 0.2 K/UL (ref 0–0.5)
EOSINOPHIL NFR BLD: 3.2 % (ref 0–8)
ERYTHROCYTE [DISTWIDTH] IN BLOOD BY AUTOMATED COUNT: 12.5 % (ref 11.5–14.5)
EST. GFR  (NO RACE VARIABLE): >60 ML/MIN/1.73 M^2
GLUCOSE SERPL-MCNC: 92 MG/DL (ref 70–110)
HCT VFR BLD AUTO: 28.6 % (ref 37–48.5)
HGB BLD-MCNC: 9.5 G/DL (ref 12–16)
IMM GRANULOCYTES # BLD AUTO: 0.02 K/UL (ref 0–0.04)
IMM GRANULOCYTES NFR BLD AUTO: 0.3 % (ref 0–0.5)
LYMPHOCYTES # BLD AUTO: 0.9 K/UL (ref 1–4.8)
LYMPHOCYTES NFR BLD: 15 % (ref 18–48)
MCH RBC QN AUTO: 32 PG (ref 27–31)
MCHC RBC AUTO-ENTMCNC: 33.2 G/DL (ref 32–36)
MCV RBC AUTO: 96 FL (ref 82–98)
MONOCYTES # BLD AUTO: 0.5 K/UL (ref 0.3–1)
MONOCYTES NFR BLD: 7.7 % (ref 4–15)
NEUTROPHILS # BLD AUTO: 4.3 K/UL (ref 1.8–7.7)
NEUTROPHILS NFR BLD: 73.3 % (ref 38–73)
NRBC BLD-RTO: 0 /100 WBC
PLATELET # BLD AUTO: 183 K/UL (ref 150–450)
PMV BLD AUTO: 12 FL (ref 9.2–12.9)
POTASSIUM SERPL-SCNC: 4 MMOL/L (ref 3.5–5.1)
PROT SERPL-MCNC: 5.3 G/DL (ref 6–8.4)
RBC # BLD AUTO: 2.97 M/UL (ref 4–5.4)
RPR SER QL: NORMAL
SODIUM SERPL-SCNC: 140 MMOL/L (ref 136–145)
WBC # BLD AUTO: 5.87 K/UL (ref 3.9–12.7)

## 2023-09-09 PROCEDURE — 25000003 PHARM REV CODE 250: Performed by: HOSPITALIST

## 2023-09-09 PROCEDURE — 80053 COMPREHEN METABOLIC PANEL: CPT | Performed by: HOSPITALIST

## 2023-09-09 PROCEDURE — G0378 HOSPITAL OBSERVATION PER HR: HCPCS

## 2023-09-09 PROCEDURE — 99239 HOSP IP/OBS DSCHRG MGMT >30: CPT | Mod: ,,, | Performed by: HOSPITALIST

## 2023-09-09 PROCEDURE — 36415 COLL VENOUS BLD VENIPUNCTURE: CPT | Performed by: HOSPITALIST

## 2023-09-09 PROCEDURE — 85025 COMPLETE CBC W/AUTO DIFF WBC: CPT | Performed by: HOSPITALIST

## 2023-09-09 PROCEDURE — 99239 PR HOSPITAL DISCHARGE DAY,>30 MIN: ICD-10-PCS | Mod: ,,, | Performed by: HOSPITALIST

## 2023-09-09 RX ADMIN — AMLODIPINE BESYLATE 5 MG: 5 TABLET ORAL at 08:09

## 2023-09-09 RX ADMIN — ATORVASTATIN CALCIUM 20 MG: 20 TABLET, FILM COATED ORAL at 08:09

## 2023-09-09 RX ADMIN — DOCUSATE SODIUM 100 MG: 100 CAPSULE, LIQUID FILLED ORAL at 08:09

## 2023-09-09 RX ADMIN — ASPIRIN 81 MG: 81 TABLET, COATED ORAL at 08:09

## 2023-09-09 NOTE — DISCHARGE SUMMARY
[facility]ron Mahmood - Neurosurgery ([facility])  [facility] Medicine  Discharge Summary      Patient Name: Ashley Mak  MRN: [id number]  [facility] Clinic: [id number]  Patient Class: OP- Observation  Admission Date: 9/6/2023  Hospital Length of Stay: 0 days  Discharge Date and Time: 9/9/2023 10:26 AM  Attending Physician: Ras Fuentes MD   Discharging Provider: Ras Fuentes MD  Primary Care Provider: Sabra Burns MD  [facility] Medicine Team: [facility] N Ras Fuentes MD  Primary Care Team: [facility] N    HPI:   Ashley Mak is an 87 year old white woman with hypertension, coronary artery disease status post coronary angioplasty with stent placement, right cerebellopontine angle schwannoma, 1 mm anterior communicating artery aneurysm, short-term memory loss. She lives in [place], [place]. She is . Her primary care physician is Dr. Sabra Burns in [place], [place].    She presented to [facility] on 9/5/2023 after a fall with head trauma. She had leaned back and hit her head on a piece of furniture. She had no loss of consciousness. She had been staying with her daughter for the past month, a visit that was longer than expected due to her daughter becoming ill and having to go to the hospital. She had a laceration that was repaired in the emergency department. While this was being done, she had a period of decreased responsiveness, nystagmus, and inability to answer questions, which was concerning for a post-concussive seizure. Head CT showed no change in her brain mass compared to 7/29/2023. Cervical spine CT showed no fracture. Her case was discussed with Neurosurgery at [facility]. She was transferred for Neurosurgery evaluation. She was transferred to [facility] on 9/6/2023 and admitted to [facility] Medicine Team N. She was oriented to person, place, and year but did not remember the events that led her to going to the hospital. Her  daughter reported that she seemed to be improving but that she may have had worsening memory problems over the last few months.         Hospital Course:   She reported chronic cough when taking medications. Speech Language Pathology was consulted and recommended soft and bite-sized diet. Neurosurgery suspected she had a concussion and felt nothing else had to be done as her brain mass appeared to be stable. Physical and Occupational Therapy recommended skilled nursing facility. Her daughter reported that she was under the impression that her mother was transferred for evaluation by a neurologist for her memory loss, not the acute problem. Plan was discussed with her to get outpatient evaluation for dementia. Her daughter decided to take her home rather than a skilled nursing facility, where her memory loss could cause problems. Dementia workup was started with some labs, as she already had brain imaging done. RPR was non-reactive, folate and vitamin B12 levels were normal.        Goals of Care Treatment Preferences:  Code Status: Full Code      Consults:   Consults (From admission, onward)          Status Ordering Provider     Inpatient consult to Social Work  Once        Provider:  (Not yet assigned)    Acknowledged IRA FISCHER A     Inpatient consult to Neurosurgery  Once        Provider:  (Not yet assigned)    Completed DOMINIC GUZMAN          Final Active Diagnoses:    Diagnosis Date Noted POA    PRINCIPAL PROBLEM:  Cerebral concussion [S06.0XAA] 09/06/2023 Yes    Short-term memory loss [R41.3] 09/07/2023 Yes     Chronic    Debility [R53.81] 09/06/2023 Yes    Neoplasm of brain causing mass effect and brain compression on adjacent structures [D49.6, G93.5] 08/02/2023 Yes     Chronic    Schwannoma [D36.10] 08/02/2023 Yes     Chronic    CAD (coronary artery disease) [I25.10] 07/26/2023 Yes     Chronic    Primary hypertension [I10] 07/26/2023 Yes     Chronic      Problems Resolved During this Admission:        Discharged Condition: good    Disposition: Home-Health Care [facility]    Follow Up:   Follow-up Information       a neurologist Follow up.    Why: A neurosurgeon manages brain tumors. A neurologist evaluates memory loss.                         Patient Instructions:      Diet Adult Regular   Order Comments: Soft and bite sized     Notify your health care provider if you experience any of the following:  increased confusion or weakness     Activity as tolerated       Significant Diagnostic Studies:   MRI Brain W WO Contrast 9/07/23: FINDINGS:   Intracranial compartment:   Large mixed cystic and solid right cerebellopontine angle centered mass measuring 5.9 x 2.6 cm with a solid component measuring 2.7 x 3.2 cm (sequence 12 image 8 and sequence 12 image 6).  Cystic component somewhat lobulated with septations.  Solid component is slightly hypointense to surrounding brain parenchyma on T1 weighted images and demonstrates avid postcontrast enhancement with extension into Meckel's cave on the right.  The cystic component extends inferiorly to the cervicomedullary junction causing mild mass effect and leftward displacement of the brainstem as well as causes partial effacement of the 4th ventricle.  The cystic component also possibly extends into the right internal auditory canal.   No acute parenchymal abnormality.  No acute hemorrhage or acute infarct.  Mild edema noted in the midbrain adjacent to the mass.  There is periventricular white matter T2/FLAIR signal hyperintensity which is patchy and confluent likely reflecting chronic microvascular ischemic changes although transependymal flow of CSF is also a consideration.   Prominence of the ventricles with a 3rd ventricular caliber of 1.1 cm, similar when compared to prior dating back to 07/25/2023 with the 3rd ventricle measured 1.0 cm.   Normal vascular flow voids are preserved.   Skull/extracranial contents (limited evaluation):   Swelling of the superior right  parietal soft tissues with associated susceptibility artifact likely relating to laceration repair..  Bone marrow signal intensity is normal.   Impression:  Mixed cystic and solid mass centered in right cerebellopontine angle with associated mass effect on the brainstem with partial effacement of the 4th ventricle.  Stable size and configuration of the ventricular system when compared to prior examinations noting prominence of the ventricular system as compared to the sulcal definition.  Findings may represent primary intracranial tumor such as schwannoma versus metastatic disease.   Findings which can be seen in chronic microvascular ischemic changes.  Confluent periventricular white matter T2/FLAIR hyperintensity may alternatively reflect transependymal flow of CSF.   Soft tissue swelling of the left parietal soft tissues with associated artifact likely from repair.   Additional findings as above.   X-Ray Chest AP Portable 9/08/23: FINDINGS:   5 mm size calcified granuloma right lung base.  Vague 1.6 cm sized density overlies right anterior 4th rib, nonspecific, differential diagnosis includes loose superimposition of shadows, post-traumatic change costochondral junction with possible old posttraumatic fracture deformity adjacent anterior 5th rib.  Lungs otherwise clear.  Mild hyperinflation of lungs.  Heart normal size.     Medications:  Reconciled Home Medications:      Medication List        CONTINUE taking these medications      acetaminophen 500 MG tablet  Commonly known as: TYLENOL  Take 1,000 mg by mouth every 8 (eight) hours.     amLODIPine 5 MG tablet  Commonly known as: NORVASC  Take 5 mg by mouth once daily.     aspirin 81 MG EC tablet  Commonly known as: ECOTRIN  Take 81 mg by mouth once daily.     atorvastatin 20 MG tablet  Commonly known as: LIPITOR  Take 1 tablet by mouth once daily.     calcium carbonate 200 mg calcium (500 mg) chewable tablet  Commonly known as: TUMS  Take 500 mg by mouth once  daily.     cholecalciferol (vitamin D3) 25 mcg (1,000 unit) capsule  Commonly known as: VITAMIN D3  Take 1,000 Units by mouth.     denosumab 60 mg/mL Syrg  Commonly known as: PROLIA  Inject 60 mg into the skin.     docusate sodium 100 MG capsule  Commonly known as: COLACE  Take 100 mg by mouth 2 (two) times daily.     linaCLOtide 145 mcg Cap capsule  Commonly known as: LINZESS  Take 145 mcg by mouth.     MULTIVITAMIN ORAL  Take 1 tablet by mouth once daily.     polyethylene glycol 17 gram/dose powder  Commonly known as: GLYCOLAX  Take 17 g by mouth.     sodium chloride 0.65 % nasal spray  Commonly known as: OCEAN  1 spray by Nasal route daily as needed.              Indwelling Lines/Drains at time of discharge: None      Time spent on the discharge of patient: 35 minutes         Ras Fuentes MD  Department of Hospital Medicine  [facility] Neurosurgery ([facility])

## 2023-09-09 NOTE — NURSING
Pt discharged home with daughter, personal vehicle. ON room air, no distress. IV removed. Written discharge instructions provided, given orally. All questions answered.

## 2023-09-09 NOTE — DISCHARGE INSTRUCTIONS
You were diagnosed with a concussion after a fall. 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  You have short term memory loss that may be due to dementia. You should follow up with your neurologist for further evaluation. You can take these results to your neurologist.    MRI Brain W WO Contrast [[id number]] Resulted: 09/07/23 1752   Order Status: Completed Updated: 09/07/23 1754   Narrative:     EXAMINATION:   MRI BRAIN W WO CONTRAST     CLINICAL HISTORY:   brain tumor;.     TECHNIQUE:   Multiplanar multisequence MR imaging of the brain was performed before and after the administration of 6 mL Gadavist  intravenous contrast.     COMPARISON:   CT head 07/25/2023, 09/05/2023, report of outside MRI brain 06/24/2023     FINDINGS:   Intracranial compartment:     Large mixed cystic and solid right cerebellopontine angle centered mass measuring 5.9 x 2.6 cm with a solid component measuring 2.7 x 3.2 cm (sequence 12 image 8 and sequence 12 image 6).  Cystic component somewhat lobulated with septations.  Solid component is slightly hypointense to surrounding brain parenchyma on T1 weighted images and demonstrates avid postcontrast enhancement with extension into Meckel's cave on the right.  The cystic component extends inferiorly to the cervicomedullary junction causing mild mass effect and leftward displacement of the brainstem as well as causes partial effacement of the 4th ventricle.  The cystic component also possibly extends into the right internal auditory canal.     No acute parenchymal abnormality.  No acute hemorrhage or acute infarct.  Mild edema noted in the midbrain adjacent to the mass.  There is periventricular white matter T2/FLAIR signal hyperintensity which is patchy and confluent likely reflecting chronic microvascular ischemic changes although transependymal flow of CSF is also a consideration.     Prominence of the ventricles with a  3rd ventricular caliber of 1.1 cm, similar when compared to prior dating back to 07/25/2023 with the 3rd ventricle measured 1.0 cm.     Normal vascular flow voids are preserved.     Skull/extracranial contents (limited evaluation):     Swelling of the superior right parietal soft tissues with associated susceptibility artifact likely relating to laceration repair..  Bone marrow signal intensity is normal.    Impression:       Mixed cystic and solid mass centered in right cerebellopontine angle with associated mass effect on the brainstem with partial effacement of the 4th ventricle.  Stable size and configuration of the ventricular system when compared to prior examinations noting prominence of the ventricular system as compared to the sulcal definition.  Findings may represent primary intracranial tumor such as schwannoma versus metastatic disease.     Findings which can be seen in chronic microvascular ischemic changes.  Confluent periventricular white matter T2/FLAIR hyperintensity may alternatively reflect transependymal flow of CSF.     Soft tissue swelling of the left parietal soft tissues with associated artifact likely from repair.     Additional findings as above.     Electronically signed by resident: Ely Mike   Date: 09/07/2023   Time: 17:05     Electronically signed by: Chivo Chung MD   Date: 09/07/2023   Time: 17:52       9/8/2023:  RPR Non-reactive Non-reactive      Vitamin B-12 210 - 950 pg/mL 395      Folate 4.0 - 24.0 ng/mL 12.0      X-Ray Chest AP Portable [[id number]] Resulted: 09/08/23 1658   Order Status: Completed Updated: 09/08/23 1700   Narrative:     EXAMINATION:   XR CHEST AP PORTABLE     CLINICAL HISTORY:   screening for skilled nursing facility placement;     TECHNIQUE:   Single frontal view of the chest was performed.     COMPARISON:   None     FINDINGS:   5 mm size calcified granuloma right lung base.  Vague 1.6 cm sized density overlies right anterior 4th rib, nonspecific,  differential diagnosis includes loose superimposition of shadows, post-traumatic change costochondral junction with possible old posttraumatic fracture deformity adjacent anterior 5th rib.  Lungs otherwise clear.  Mild hyperinflation of lungs.  Heart normal size.    Impression:       No definite active process.       Electronically signed by: Quentin Carrasquillo MD   Date: 09/08/2023   Time: 16:58

## 2023-09-09 NOTE — PLAN OF CARE
CM in communication with Gala with [facility].  Patient was only receiving PT.  [facility] asked if they could resume just the PT. CM advised it is ok to resume PT but to add OT as well.

## 2023-09-09 NOTE — PLAN OF CARE
[facility] ([facility])  Discharge Final Note    Primary Care Provider: Sabra Burns MD    Expected Discharge Date: 9/9/2023    Patient to be discharged home.  The patient was current with [facility].  New Orders sent to [facility].  Family to provide transportation home.         Final Discharge Note (most recent)       Final Note - 09/09/23 0829          Final Note    Assessment Type Final Discharge Note     Anticipated Discharge Disposition Home-Health Care Svc        Post-Acute Status    Post-Acute Authorization Home Health     Home Health Status Referrals Sent     Discharge Delays None known at this time                     Important Message from Medicare             Contact Info       a neurologist        Next Steps: Follow up    Instructions: A neurosurgeon manages brain tumors. A neurologist evaluates memory loss.

## 2023-09-09 NOTE — PLAN OF CARE
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       Follow Up Appointments:  No future appointments.    Allergies:  Review of patient's allergies indicates:   Allergen Reactions    Tetracyclines Swelling     Doesn't remember clearly, rxn to anything "cycline    Sutures, silk     Iron analogues Rash    Nylon Rash     Sores all along nylon stitches       Medications: Review discharge medications with patient and family and provide education.    Current Facility-Administered Medications   Medication Dose Route Frequency Provider Last Rate Last Admin    acetaminophen tablet 650 mg  650 mg Oral Q4H PRN Ra Gongora MD        amLODIPine tablet 5 mg  5 mg Oral Daily Ra Gongora MD   5 mg at 09/08/23 0911    aspirin EC tablet 81 mg  81 mg Oral Daily Ra Gongora MD   81 mg at 09/08/23 0911    atorvastatin tablet 20 mg  20 mg Oral Daily Ra Gongora MD   20 mg at 09/08/23 0912    bisacodyL EC tablet 5 mg  5 mg Oral Daily PRN Ras Fuentes MD   5 mg at 09/08/23 1754    docusate sodium capsule 100 mg  100 mg Oral BID Ra Gongora MD   100 mg at 09/08/23 2009    glucose chewable tablet 16 g  16 g Oral PRN Ra Gongora MD     "    glucose chewable tablet 24 g  24 g Oral PRRa Grajeda MD        melatonin tablet 6 mg  6 mg Oral Nightly PRRa Grajeda MD   6 mg at 09/08/23 2009    naloxone 0.4 mg/mL injection 0.02 mg  0.02 mg Intravenous Ra Valentine MD        ondansetron injection 4 mg  4 mg Intravenous Q8H Ra Valentine MD        prochlorperazine injection Soln 5 mg  5 mg Intravenous Q6H PRRa Grajeda MD        sodium chloride 0.9% flush 10 mL  10 mL Intravenous PRRa Grajeda MD         Current Discharge Medication List        CONTINUE these medications which have NOT CHANGED    Details   acetaminophen (TYLENOL) 500 MG tablet Take 1,000 mg by mouth every 8 (eight) hours.      amLODIPine (NORVASC) 5 MG tablet Take 5 mg by mouth once daily.      aspirin (ECOTRIN) 81 MG EC tablet Take 81 mg by mouth once daily.      atorvastatin (LIPITOR) 20 MG tablet Take 1 tablet by mouth once daily.      calcium carbonate (TUMS) 200 mg calcium (500 mg) chewable tablet Take 500 mg by mouth once daily.      cholecalciferol, vitamin D3, (VITAMIN D3) 25 mcg (1,000 unit) capsule Take 1,000 Units by mouth.      denosumab (PROLIA) 60 mg/mL Syrg Inject 60 mg into the skin.      docusate sodium (COLACE) 100 MG capsule Take 100 mg by mouth 2 (two) times daily.      linaCLOtide (LINZESS) 145 mcg Cap capsule Take 145 mcg by mouth.      MULTIVITAMIN ORAL Take 1 tablet by mouth once daily.      polyethylene glycol (GLYCOLAX) 17 gram/dose powder Take 17 g by mouth.      sodium chloride (OCEAN) 0.65 % nasal spray 1 spray by Nasal route daily as needed.               I have seen and examined this patient within the last 30 days. My clinical findings that support the need for the home health skilled services and home bound status are the following:no   Weakness/numbness causing balance and gait disturbance due to brain tumor making it taxing to leave home.     Diet:   other soft and bite-sized    Labs:  none    Referrals/  Consults  Physical Therapy to evaluate and treat. Evaluate for home safety and equipment needs; Establish/upgrade home exercise program. Perform / instruct on therapeutic exercises, gait training, transfer training, and Range of Motion.  Occupational Therapy to evaluate and treat. Evaluate home environment for safety and equipment needs. Perform/Instruct on transfers, ADL training, ROM, and therapeutic exercises.  Speech Therapy  to evaluate and treat for  Swallowing.    Activities:   activity as tolerated    Nursing:   Agency to admit patient within 24 hours of hospital discharge unless specified on physician order or at patient request    SN to complete comprehensive assessment including routine vital signs. Instruct on disease process and s/s of complications to report to MD. Review/verify medication list sent home with the patient at time of discharge  and instruct patient/caregiver as needed. Frequency may be adjusted depending on start of care date.     Skilled nurse to perform up to 3 visits PRN for symptoms related to diagnosis    Notify MD if SBP > 160 or < 90; DBP > 90 or < 50; HR > 120 or < 50; Temp > 101; O2 < 88%    Ok to schedule additional visits based on staff availability and patient request on consecutive days within the home health episode.    When multiple disciplines ordered:    Start of Care occurs on Sunday - Wednesday schedule remaining discipline evaluations as ordered on separate consecutive days following the start of care.    Thursday SOC -schedule subsequent evaluations Friday and Monday the following week.     Friday - Saturday SOC - schedule subsequent discipline evaluations on consecutive days starting Monday of the following week.    For all post-discharge communication and subsequent orders please contact patient's primary care physician. If unable to reach primary care physician or do not receive response within 30 minutes, please contact primary care physician for clinical staff  order clarification    Miscellaneous   None    Home Health Aide:  Physical Therapy Three times weekly, Occupational Therapy Three times weekly, and Speech Language Pathology Weekly    Wound Care Orders  no    I certify that this patient is confined to her home and needs physical therapy, speech therapy, and occupational therapy.        Ras Fuentes MD  [facility]  09/09/2023

## 2023-09-12 ENCOUNTER — TELEPHONE (OUTPATIENT)
Dept: FAMILY MEDICINE | Facility: CLINIC | Age: 88
End: 2023-09-12
Payer: MEDICARE

## 2023-09-12 NOTE — TELEPHONE ENCOUNTER
Spoke with daughter and educated her about cleanliness of staple site. Daughter states understanding and will come for upcoming appointment for removal of staples.

## 2023-09-12 NOTE — TELEPHONE ENCOUNTER
----- Message from Elmo Cole sent at 2023 11:24 AM CDT -----  Contact: katelyn Mak  MRN: [id number]  : 1935  PCP: Sabra Burns  Home Phone      [phone]  Work Phone      Not on file.  Mobile          [phone]      MESSAGE:   Pt daughter is inquiring about her mothers staples, she wasn't aware upcoming appt is for them to remove them, she wasn't aware on how to clean, or care for them    [phone]

## 2023-09-13 DIAGNOSIS — Z78.0 MENOPAUSE: ICD-10-CM

## 2023-09-15 ENCOUNTER — OFFICE VISIT (OUTPATIENT)
Dept: FAMILY MEDICINE | Facility: CLINIC | Age: 88
End: 2023-09-15
Payer: MEDICARE

## 2023-09-15 VITALS
HEART RATE: 72 BPM | SYSTOLIC BLOOD PRESSURE: 148 MMHG | DIASTOLIC BLOOD PRESSURE: 74 MMHG | WEIGHT: 113.56 LBS | RESPIRATION RATE: 16 BRPM | BODY MASS INDEX: 20.12 KG/M2 | HEIGHT: 63 IN

## 2023-09-15 DIAGNOSIS — G93.5 NEOPLASM OF BRAIN CAUSING MASS EFFECT AND BRAIN COMPRESSION ON ADJACENT STRUCTURES: Chronic | ICD-10-CM

## 2023-09-15 DIAGNOSIS — R53.81 DEBILITY: Primary | ICD-10-CM

## 2023-09-15 DIAGNOSIS — D49.6 NEOPLASM OF BRAIN CAUSING MASS EFFECT AND BRAIN COMPRESSION ON ADJACENT STRUCTURES: Chronic | ICD-10-CM

## 2023-09-15 PROCEDURE — 3078F PR MOST RECENT DIASTOLIC BLOOD PRESSURE < 80 MM HG: ICD-10-PCS | Mod: CPTII,S$GLB,, | Performed by: FAMILY MEDICINE

## 2023-09-15 PROCEDURE — 99496 TRANSITIONAL CARE MANAGE SERVICE 7 DAY DISCHARGE: ICD-10-PCS | Mod: S$GLB,,, | Performed by: FAMILY MEDICINE

## 2023-09-15 PROCEDURE — 1126F PR PAIN SEVERITY QUANTIFIED, NO PAIN PRESENT: ICD-10-PCS | Mod: CPTII,S$GLB,, | Performed by: FAMILY MEDICINE

## 2023-09-15 PROCEDURE — 99999 PR PBB SHADOW E&M-EST. PATIENT-LVL III: ICD-10-PCS | Mod: PBBFAC,,, | Performed by: FAMILY MEDICINE

## 2023-09-15 PROCEDURE — 1159F MED LIST DOCD IN RCRD: CPT | Mod: CPTII,S$GLB,, | Performed by: FAMILY MEDICINE

## 2023-09-15 PROCEDURE — 99496 TRANSJ CARE MGMT HIGH F2F 7D: CPT | Mod: S$GLB,,, | Performed by: FAMILY MEDICINE

## 2023-09-15 PROCEDURE — 1101F PT FALLS ASSESS-DOCD LE1/YR: CPT | Mod: CPTII,S$GLB,, | Performed by: FAMILY MEDICINE

## 2023-09-15 PROCEDURE — 99999 PR PBB SHADOW E&M-EST. PATIENT-LVL III: CPT | Mod: PBBFAC,,, | Performed by: FAMILY MEDICINE

## 2023-09-15 PROCEDURE — 3077F SYST BP >= 140 MM HG: CPT | Mod: CPTII,S$GLB,, | Performed by: FAMILY MEDICINE

## 2023-09-15 PROCEDURE — 1126F AMNT PAIN NOTED NONE PRSNT: CPT | Mod: CPTII,S$GLB,, | Performed by: FAMILY MEDICINE

## 2023-09-15 PROCEDURE — 3288F PR FALLS RISK ASSESSMENT DOCUMENTED: ICD-10-PCS | Mod: CPTII,S$GLB,, | Performed by: FAMILY MEDICINE

## 2023-09-15 PROCEDURE — 1159F PR MEDICATION LIST DOCUMENTED IN MEDICAL RECORD: ICD-10-PCS | Mod: CPTII,S$GLB,, | Performed by: FAMILY MEDICINE

## 2023-09-15 PROCEDURE — 3288F FALL RISK ASSESSMENT DOCD: CPT | Mod: CPTII,S$GLB,, | Performed by: FAMILY MEDICINE

## 2023-09-15 PROCEDURE — 3077F PR MOST RECENT SYSTOLIC BLOOD PRESSURE >= 140 MM HG: ICD-10-PCS | Mod: CPTII,S$GLB,, | Performed by: FAMILY MEDICINE

## 2023-09-15 PROCEDURE — 1101F PR PT FALLS ASSESS DOC 0-1 FALLS W/OUT INJ PAST YR: ICD-10-PCS | Mod: CPTII,S$GLB,, | Performed by: FAMILY MEDICINE

## 2023-09-15 PROCEDURE — 3078F DIAST BP <80 MM HG: CPT | Mod: CPTII,S$GLB,, | Performed by: FAMILY MEDICINE

## 2023-09-15 NOTE — PROGRESS NOTES
Transitional Care Note  Subjective:       Patient ID: Ashley Mak is a 87 y.o. female.  Chief Complaint: Follow-up (ER follow up for fall) and Suture / Staple Removal (Patient here to have staples removed from her head)    Family and/or Caretaker present at visit?  Yes.  Diagnostic tests reviewed/disposition: No diagnosic tests pending after this hospitalization.  Disease/illness education: schwannoma, concussion  Home health/community services discussion/referrals: Patient has home health established at [facility] .   Establishment or re-establishment of referral orders for community resources: No other necessary community resources.   Discussion with other health care providers: No discussion with other health care providers necessary.   87 year old female with schwannoma and increased falls likely secondary to imbalance and mass effect suffered a concussion and was transferred to [facility] for NS eval. She comes in today and is still feeling unsteady and like her 'head is full.' She has staples that need to be removed today.      Review of Systems   Constitutional:  Negative for chills and fever.   HENT:  Negative for congestion, ear pain, postnasal drip, rhinorrhea, sore throat and trouble swallowing.    Eyes:  Negative for redness and itching.   Respiratory:  Negative for cough, shortness of breath and wheezing.    Cardiovascular:  Negative for chest pain and palpitations.   Gastrointestinal:  Negative for abdominal pain, diarrhea, nausea and vomiting.   Genitourinary:  Negative for dysuria and frequency.   Musculoskeletal:  Positive for gait problem.   Skin:  Negative for rash.   Neurological:  Positive for dizziness. Negative for weakness and headaches.       Objective:      Physical Exam  Vitals and nursing note reviewed.   Constitutional:       General: She is not in acute distress.     Appearance: She is well-developed.   HENT:      Head: Normocephalic and atraumatic.   Eyes:      Conjunctiva/sclera: Conjunctivae  normal.      Pupils: Pupils are equal, round, and reactive to light.   Neck:      Thyroid: No thyromegaly.   Cardiovascular:      Rate and Rhythm: Normal rate and regular rhythm.      Heart sounds: Normal heart sounds.   Pulmonary:      Effort: Pulmonary effort is normal. No respiratory distress.      Breath sounds: Normal breath sounds. No wheezing.   Abdominal:      General: Bowel sounds are normal.      Palpations: Abdomen is soft.      Tenderness: There is no abdominal tenderness.   Musculoskeletal:         General: Normal range of motion.      Cervical back: Normal range of motion and neck supple.   Lymphadenopathy:      Cervical: No cervical adenopathy.   Skin:     General: Skin is warm and dry.      Findings: No rash.      Comments: 3 staples over right scalp   Neurological:      Mental Status: She is alert and oriented to person, place, and time.   Psychiatric:         Behavior: Behavior normal.         Assessment:       1. Debility    2. Neoplasm of brain causing mass effect and brain compression on adjacent structures        Plan:       Ashley was seen today for follow-up and suture / staple removal.    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    Resume h/h.    RTC if condition acutely worsens or any other concerns, otherwise RTC as scheduled    Will f/u with NS in [place].

## 2023-09-21 ENCOUNTER — TELEPHONE (OUTPATIENT)
Dept: FAMILY MEDICINE | Facility: CLINIC | Age: 88
End: 2023-09-21
Payer: MEDICARE

## 2023-09-21 DIAGNOSIS — R41.3 SHORT-TERM MEMORY LOSS: Primary | ICD-10-CM

## 2023-09-21 DIAGNOSIS — S06.0X9A CEREBRAL CONCUSSION, WITH LOSS OF CONSCIOUSNESS, INITIAL ENCOUNTER: ICD-10-CM

## 2023-09-21 NOTE — TELEPHONE ENCOUNTER
----- Message from Ilya Sanabria sent at 2023 10:53 AM CDT -----  Contact: Daughter - Misa Mak  MRN: [id number]  : 1935  PCP: Marlene, Primary Doctor  Home Phone      [phone]  Work Phone      Not on file.  Mobile          [phone]      MESSAGE: saw Dr Burns on 9/15/23 -- requesting referral to Neurologist (Dr Joyce Cortez) in [place], [place] -- please fax referral along with clinic notes to [phone]    Tesfaye Bynum @ [phone]    PCP: [facility]

## 2023-09-25 ENCOUNTER — TELEPHONE (OUTPATIENT)
Dept: FAMILY MEDICINE | Facility: CLINIC | Age: 88
End: 2023-09-25

## 2023-09-25 ENCOUNTER — DOCUMENT SCAN (OUTPATIENT)
Dept: HOME HEALTH SERVICES | Facility: HOSPITAL | Age: 88
End: 2023-09-25
Payer: MEDICARE

## 2023-09-25 NOTE — TELEPHONE ENCOUNTER
Nella with [facility] Looking to see where the orders for a transport wheel chair was sent to?      Phone [phone]

## 2023-09-27 ENCOUNTER — TELEPHONE (OUTPATIENT)
Dept: FAMILY MEDICINE | Facility: CLINIC | Age: 88
End: 2023-09-27

## 2023-09-27 PROCEDURE — G0179 MD RECERTIFICATION HHA PT: HCPCS | Mod: ,,, | Performed by: FAMILY MEDICINE

## 2023-09-27 NOTE — TELEPHONE ENCOUNTER
"Call received from [employer]. States that more documentation is needed for patient wheelchair. Insurance is requiring this statement to be addend to chart notes.     " Patient requires light weight wheel chair to complete ADL's within home and requires use for 2 or more hours daily. Patient able to manually self propel chair, and can/walker has been ruled out"      Please advise  Will fax note after to [phone]  "

## 2023-10-03 ENCOUNTER — TELEPHONE (OUTPATIENT)
Dept: FAMILY MEDICINE | Facility: CLINIC | Age: 88
End: 2023-10-03
Payer: MEDICARE

## 2023-10-03 ENCOUNTER — HOSPITAL ENCOUNTER (EMERGENCY)
Facility: HOSPITAL | Age: 88
Discharge: HOME | End: 2023-10-03
Attending: STUDENT IN AN ORGANIZED HEALTH CARE EDUCATION/TRAINING PROGRAM
Payer: MEDICARE

## 2023-10-03 VITALS
OXYGEN SATURATION: 99 % | RESPIRATION RATE: 18 BRPM | HEART RATE: 91 BPM | DIASTOLIC BLOOD PRESSURE: 75 MMHG | TEMPERATURE: 98 F | HEIGHT: 63 IN | SYSTOLIC BLOOD PRESSURE: 154 MMHG | WEIGHT: 115 LBS | BODY MASS INDEX: 20.38 KG/M2

## 2023-10-03 DIAGNOSIS — R41.82 AMS (ALTERED MENTAL STATUS): ICD-10-CM

## 2023-10-03 DIAGNOSIS — E87.6 HYPOKALEMIA: ICD-10-CM

## 2023-10-03 DIAGNOSIS — I10 PRIMARY HYPERTENSION: ICD-10-CM

## 2023-10-03 DIAGNOSIS — R41.3 MEMORY LOSS: Primary | ICD-10-CM

## 2023-10-03 LAB
ALBUMIN SERPL BCP-MCNC: 3.8 G/DL (ref 3.5–5.2)
ALP SERPL-CCNC: 83 U/L (ref 55–135)
ALT SERPL W/O P-5'-P-CCNC: 12 U/L (ref 10–44)
AMMONIA PLAS-SCNC: 22 UMOL/L (ref 10–50)
AMPHET+METHAMPHET UR QL: NEGATIVE
ANION GAP SERPL CALC-SCNC: 9 MMOL/L (ref 8–16)
APAP SERPL-MCNC: <3 UG/ML (ref 10–20)
AST SERPL-CCNC: 17 U/L (ref 10–40)
BACTERIA #/AREA URNS HPF: ABNORMAL /HPF
BARBITURATES UR QL SCN>200 NG/ML: NEGATIVE
BASOPHILS # BLD AUTO: 0.04 K/UL (ref 0–0.2)
BASOPHILS NFR BLD: 0.4 % (ref 0–1.9)
BENZODIAZ UR QL SCN>200 NG/ML: NEGATIVE
BILIRUB SERPL-MCNC: 0.5 MG/DL (ref 0.1–1)
BILIRUB UR QL STRIP: NEGATIVE
BUN SERPL-MCNC: 15 MG/DL (ref 8–23)
BZE UR QL SCN: NEGATIVE
CALCIUM SERPL-MCNC: 11.5 MG/DL (ref 8.7–10.5)
CANNABINOIDS UR QL SCN: NEGATIVE
CHLORIDE SERPL-SCNC: 103 MMOL/L (ref 95–110)
CLARITY UR: CLEAR
CO2 SERPL-SCNC: 32 MMOL/L (ref 23–29)
COLOR UR: YELLOW
CREAT SERPL-MCNC: 0.8 MG/DL (ref 0.5–1.4)
CREAT UR-MCNC: 54.6 MG/DL (ref 15–325)
DIFFERENTIAL METHOD: ABNORMAL
EOSINOPHIL # BLD AUTO: 0.1 K/UL (ref 0–0.5)
EOSINOPHIL NFR BLD: 1.1 % (ref 0–8)
ERYTHROCYTE [DISTWIDTH] IN BLOOD BY AUTOMATED COUNT: 12.4 % (ref 11.5–14.5)
EST. GFR  (NO RACE VARIABLE): >60 ML/MIN/1.73 M^2
ETHANOL SERPL-MCNC: <10 MG/DL
GLUCOSE SERPL-MCNC: 105 MG/DL (ref 70–110)
GLUCOSE UR QL STRIP: NEGATIVE
HCT VFR BLD AUTO: 38 % (ref 37–48.5)
HGB BLD-MCNC: 12.1 G/DL (ref 12–16)
HGB UR QL STRIP: ABNORMAL
HYALINE CASTS #/AREA URNS LPF: 0 /LPF
IMM GRANULOCYTES # BLD AUTO: 0.04 K/UL (ref 0–0.04)
IMM GRANULOCYTES NFR BLD AUTO: 0.4 % (ref 0–0.5)
KETONES UR QL STRIP: NEGATIVE
LEUKOCYTE ESTERASE UR QL STRIP: NEGATIVE
LYMPHOCYTES # BLD AUTO: 1.1 K/UL (ref 1–4.8)
LYMPHOCYTES NFR BLD: 11.8 % (ref 18–48)
MCH RBC QN AUTO: 29.7 PG (ref 27–31)
MCHC RBC AUTO-ENTMCNC: 31.8 G/DL (ref 32–36)
MCV RBC AUTO: 93 FL (ref 82–98)
METHADONE UR QL SCN>300 NG/ML: NEGATIVE
MICROSCOPIC COMMENT: ABNORMAL
MONOCYTES # BLD AUTO: 0.5 K/UL (ref 0.3–1)
MONOCYTES NFR BLD: 5.4 % (ref 4–15)
NEUTROPHILS # BLD AUTO: 7.3 K/UL (ref 1.8–7.7)
NEUTROPHILS NFR BLD: 80.9 % (ref 38–73)
NITRITE UR QL STRIP: NEGATIVE
NRBC BLD-RTO: 0 /100 WBC
OPIATES UR QL SCN: NEGATIVE
PCP UR QL SCN>25 NG/ML: NEGATIVE
PH UR STRIP: 7 [PH] (ref 5–8)
PLATELET # BLD AUTO: 257 K/UL (ref 150–450)
PMV BLD AUTO: 11.2 FL (ref 9.2–12.9)
POTASSIUM SERPL-SCNC: 3.2 MMOL/L (ref 3.5–5.1)
PROT SERPL-MCNC: 6.7 G/DL (ref 6–8.4)
PROT UR QL STRIP: ABNORMAL
RBC # BLD AUTO: 4.07 M/UL (ref 4–5.4)
RBC #/AREA URNS HPF: 6 /HPF (ref 0–4)
SALICYLATES SERPL-MCNC: <5 MG/DL (ref 15–30)
SODIUM SERPL-SCNC: 144 MMOL/L (ref 136–145)
SP GR UR STRIP: 1.02 (ref 1–1.03)
TOXICOLOGY INFORMATION: NORMAL
TROPONIN I SERPL DL<=0.01 NG/ML-MCNC: 0.01 NG/ML (ref 0–0.03)
URN SPEC COLLECT METH UR: ABNORMAL
UROBILINOGEN UR STRIP-ACNC: NEGATIVE EU/DL
WBC # BLD AUTO: 9.03 K/UL (ref 3.9–12.7)
WBC #/AREA URNS HPF: 1 /HPF (ref 0–5)
YEAST URNS QL MICRO: ABNORMAL

## 2023-10-03 PROCEDURE — 80053 COMPREHEN METABOLIC PANEL: CPT | Performed by: STUDENT IN AN ORGANIZED HEALTH CARE EDUCATION/TRAINING PROGRAM

## 2023-10-03 PROCEDURE — 80307 DRUG TEST PRSMV CHEM ANLYZR: CPT | Performed by: STUDENT IN AN ORGANIZED HEALTH CARE EDUCATION/TRAINING PROGRAM

## 2023-10-03 PROCEDURE — P9612 CATHETERIZE FOR URINE SPEC: HCPCS

## 2023-10-03 PROCEDURE — 82140 ASSAY OF AMMONIA: CPT | Performed by: STUDENT IN AN ORGANIZED HEALTH CARE EDUCATION/TRAINING PROGRAM

## 2023-10-03 PROCEDURE — 93010 EKG 12-LEAD: ICD-10-PCS | Mod: ,,, | Performed by: INTERNAL MEDICINE

## 2023-10-03 PROCEDURE — 99285 EMERGENCY DEPT VISIT HI MDM: CPT | Mod: 25

## 2023-10-03 PROCEDURE — 93005 ELECTROCARDIOGRAM TRACING: CPT

## 2023-10-03 PROCEDURE — 80179 DRUG ASSAY SALICYLATE: CPT | Performed by: STUDENT IN AN ORGANIZED HEALTH CARE EDUCATION/TRAINING PROGRAM

## 2023-10-03 PROCEDURE — 82077 ASSAY SPEC XCP UR&BREATH IA: CPT | Performed by: STUDENT IN AN ORGANIZED HEALTH CARE EDUCATION/TRAINING PROGRAM

## 2023-10-03 PROCEDURE — 25000003 PHARM REV CODE 250: Performed by: STUDENT IN AN ORGANIZED HEALTH CARE EDUCATION/TRAINING PROGRAM

## 2023-10-03 PROCEDURE — 96374 THER/PROPH/DIAG INJ IV PUSH: CPT

## 2023-10-03 PROCEDURE — 63600175 PHARM REV CODE 636 W HCPCS: Performed by: STUDENT IN AN ORGANIZED HEALTH CARE EDUCATION/TRAINING PROGRAM

## 2023-10-03 PROCEDURE — 96376 TX/PRO/DX INJ SAME DRUG ADON: CPT

## 2023-10-03 PROCEDURE — 36415 COLL VENOUS BLD VENIPUNCTURE: CPT | Performed by: STUDENT IN AN ORGANIZED HEALTH CARE EDUCATION/TRAINING PROGRAM

## 2023-10-03 PROCEDURE — 85025 COMPLETE CBC W/AUTO DIFF WBC: CPT | Performed by: STUDENT IN AN ORGANIZED HEALTH CARE EDUCATION/TRAINING PROGRAM

## 2023-10-03 PROCEDURE — 84484 ASSAY OF TROPONIN QUANT: CPT | Performed by: STUDENT IN AN ORGANIZED HEALTH CARE EDUCATION/TRAINING PROGRAM

## 2023-10-03 PROCEDURE — 81000 URINALYSIS NONAUTO W/SCOPE: CPT | Mod: 59 | Performed by: STUDENT IN AN ORGANIZED HEALTH CARE EDUCATION/TRAINING PROGRAM

## 2023-10-03 PROCEDURE — 96361 HYDRATE IV INFUSION ADD-ON: CPT

## 2023-10-03 PROCEDURE — 93010 ELECTROCARDIOGRAM REPORT: CPT | Mod: ,,, | Performed by: INTERNAL MEDICINE

## 2023-10-03 PROCEDURE — 80143 DRUG ASSAY ACETAMINOPHEN: CPT | Performed by: STUDENT IN AN ORGANIZED HEALTH CARE EDUCATION/TRAINING PROGRAM

## 2023-10-03 RX ORDER — HYDRALAZINE HYDROCHLORIDE 20 MG/ML
10 INJECTION INTRAMUSCULAR; INTRAVENOUS
Status: COMPLETED | OUTPATIENT
Start: 2023-10-03 | End: 2023-10-03

## 2023-10-03 RX ORDER — AMLODIPINE BESYLATE 5 MG/1
5 TABLET ORAL DAILY
Qty: 30 TABLET | Refills: 0 | Status: SHIPPED | OUTPATIENT
Start: 2023-10-03

## 2023-10-03 RX ORDER — POTASSIUM CHLORIDE 20 MEQ/1
40 TABLET, EXTENDED RELEASE ORAL
Status: COMPLETED | OUTPATIENT
Start: 2023-10-03 | End: 2023-10-03

## 2023-10-03 RX ADMIN — POTASSIUM CHLORIDE 40 MEQ: 1500 TABLET, EXTENDED RELEASE ORAL at 10:10

## 2023-10-03 RX ADMIN — SODIUM CHLORIDE 1000 ML: 9 INJECTION, SOLUTION INTRAVENOUS at 10:10

## 2023-10-03 RX ADMIN — HYDRALAZINE HYDROCHLORIDE 10 MG: 20 INJECTION, SOLUTION INTRAMUSCULAR; INTRAVENOUS at 10:10

## 2023-10-03 RX ADMIN — HYDRALAZINE HYDROCHLORIDE 10 MG: 20 INJECTION, SOLUTION INTRAMUSCULAR; INTRAVENOUS at 12:10

## 2023-10-03 NOTE — ED PROVIDER NOTES
"Encounter Date: 10/3/2023       History     Chief Complaint   Patient presents with    Altered Mental Status     Pt's daughter states that since she fell 3 weeks ago she has "just not been herself. She fell once on her butt the day we got back from the hospital. She doesn't know things that she normally does"      87-year-old female with a history of hypertension, brain mass, CAD presents to the emergency department with her daughter via EMS for altered mental status.  Daughter at the bedside states the patient has not been the same since she was evaluated in this emergency department after a fall on September 5.  She was transferred to [facility] for neurosurgery evaluation.  Neurosurgery evaluated the patient and stated that she had a concussion it was nothing to be done regarding the brain mass.  PT and OT recommended skilled nursing facility with the patient was brought home by her daughter with home health.  Over the past 3 weeks, she has been gradually more confused and fatigued.  Home health nurse noticed that today especially, she was more fatigued than normal so advised to go to the emergency department.  She is supposed to go home to [place] tomorrow.        Review of patient's allergies indicates:   Allergen Reactions    Tetracyclines Swelling     Doesn't remember clearly, rxn to anything "cycline    Sutures, silk     Iron analogues Rash    Nylon Rash     Sores all along nylon stitches     Past Medical History:   Diagnosis Date    Hypertension     Traumatic hematoma of buttock 7/26/2023    Tumor     "in head"     Past Surgical History:   Procedure Laterality Date    CORONARY ANGIOPLASTY WITH STENT PLACEMENT       History reviewed. No pertinent family history.  Social History     Tobacco Use    Smoking status: Never    Smokeless tobacco: Never   Substance Use Topics    Alcohol use: Not Currently    Drug use: Never     Review of Systems   Unable to perform ROS: Mental status change       Physical Exam "     Initial Vitals [10/03/23 0939]   BP Pulse Resp Temp SpO2   (!) 177/91 63 18 97.8 °F (36.6 °C) 100 %      MAP       --         Physical Exam    Nursing note and vitals reviewed.  Constitutional: She is not diaphoretic. No distress.   Thin female   HENT:   Head: Normocephalic and atraumatic.   Right Ear: External ear normal.   Left Ear: External ear normal.   Eyes: Right eye exhibits no discharge. Left eye exhibits no discharge. No scleral icterus.   Neck: Neck supple.   Cardiovascular:  Normal rate and regular rhythm.           Pulmonary/Chest: Breath sounds normal. No stridor. No respiratory distress. She has no wheezes. She has no rhonchi. She has no rales.   Abdominal: Abdomen is soft. There is no abdominal tenderness. There is no guarding.   Musculoskeletal:         General: No edema.      Cervical back: Neck supple.     Neurological: She is alert. She has normal strength. No cranial nerve deficit or sensory deficit. GCS eye subscore is 4. GCS verbal subscore is 4. GCS motor subscore is 6.   Knows name, daughter    Doesn't recall birth year (knows month and day), doesn't know where she's at, doesn't know what year it is - normally daughter states she does   Skin: Skin is warm and dry. Capillary refill takes less than 2 seconds.   Psychiatric: She has a normal mood and affect.         ED Course   Critical Care    Date/Time: 10/3/2023 12:47 PM    Performed by: Gilbert Pierson DO  Authorized by: Gilbert Pierson DO  Direct patient critical care time: 20 minutes  Additional history critical care time: 5 minutes  Ordering / reviewing critical care time: 4 minutes  Documentation critical care time: 5 minutes  Consult with family critical care time: 3 minutes  Total critical care time (exclusive of procedural time) : 37 minutes  Critical care time was exclusive of separately billable procedures and treating other patients and teaching time.  Critical care was necessary to treat or prevent imminent or life-threatening  deterioration of the following conditions: circulatory failure and CNS failure or compromise.  Critical care was time spent personally by me on the following activities: development of treatment plan with patient or surrogate, evaluation of patient's response to treatment, examination of patient, obtaining history from patient or surrogate, ordering and performing treatments and interventions, ordering and review of laboratory studies, ordering and review of radiographic studies, re-evaluation of patient's condition and review of old charts.        Labs Reviewed   CBC W/ AUTO DIFFERENTIAL - Abnormal; Notable for the following components:       Result Value    MCHC 31.8 (*)     Gran % 80.9 (*)     Lymph % 11.8 (*)     All other components within normal limits   COMPREHENSIVE METABOLIC PANEL - Abnormal; Notable for the following components:    Potassium 3.2 (*)     CO2 32 (*)     Calcium 11.5 (*)     All other components within normal limits   URINALYSIS, REFLEX TO URINE CULTURE - Abnormal; Notable for the following components:    Protein, UA 1+ (*)     Occult Blood UA 2+ (*)     All other components within normal limits    Narrative:     Specimen Source->Urine   SALICYLATE LEVEL - Abnormal; Notable for the following components:    Salicylate Lvl <5.0 (*)     All other components within normal limits   ACETAMINOPHEN LEVEL - Abnormal; Notable for the following components:    Acetaminophen (Tylenol), Serum <3.0 (*)     All other components within normal limits   URINALYSIS MICROSCOPIC - Abnormal; Notable for the following components:    RBC, UA 6 (*)     All other components within normal limits    Narrative:     Specimen Source->Urine   TROPONIN I   DRUG SCREEN PANEL, URINE EMERGENCY    Narrative:     Specimen Source->Urine   ALCOHOL,MEDICAL (ETHANOL)   AMMONIA     EKG Readings: (Independently Interpreted)   Sinus rhythm with PACs. 71 bpm. Normal axis. Nonspecific ST and T wave abnormalities. No STEMI. No prior ECG for  comparison.       Imaging Results              X-Ray Chest AP Portable (Final result)  Result time 10/03/23 10:19:31      Final result by Raquel Harvey MD (10/03/23 10:19:31)                   Impression:      As above.      Electronically signed by: Raquel Harvey MD  Date:    10/03/2023  Time:    10:19               Narrative:    EXAMINATION:  XR CHEST AP PORTABLE    CLINICAL HISTORY:  Altered mental status, unspecified    TECHNIQUE:  Single frontal view of the chest was performed.    COMPARISON:  09/08/2023    FINDINGS:  The previously noted vague density overlying the right anterior 4th rib not definitively seen on today's study.The lungs are hyperexpanded with chronic lung change.  No consolidation or pleural effusions.  No evident pneumothorax.    The cardiac silhouette is enlarged..  The hilar and mediastinal contours are unremarkable.Calcified atheromatous disease affects the aorta.    Age-appropriate degenerative changes affect the skeleton.                                       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  There is no evidence for acute intracranial hemorrhage or new parenchymal hypoattenuation to suggest an acute infarction.    Age-appropriate generalized cerebral volume loss with moderate chronic microvascular ischemic disease.      Electronically signed by: Raquel Harvey MD  Date:    10/03/2023  Time:    10:22               Narrative:    EXAMINATION:  CT HEAD WITHOUT CONTRAST    CLINICAL HISTORY:  Mental status change, unknown cause;    TECHNIQUE:  Low dose axial images were obtained through the head.  Coronal and sagittal reformations  were also performed. Contrast was not administered.    COMPARISON:  09/07/2023    FINDINGS:  Again seen is a large cystic lesion in the right CPA angle with mass effect on the brainstem and partial effacement of the 4th ventricle, unchanged from recent MRI.  There is continued prominence of the ventricular system, grossly stable as compared to prior examinations.  Patchy hypoattenuation in the supratentorial white matter in keeping with chronic microvascular ischemic disease of a moderate severe degree.  No extra-axial fluid collections.  The visualized paranasal sinuses including the mastoid air cells are clear.                                       Medications   sodium chloride 0.9% bolus 1,000 mL 1,000 mL (0 mLs Intravenous Stopped 10/3/23 1216)   potassium chloride SA CR tablet 40 mEq (40 mEq Oral Given 10/3/23 1055)   hydrALAZINE injection 10 mg (10 mg Intravenous Given 10/3/23 1055)   hydrALAZINE injection 10 mg (10 mg Intravenous Given 10/3/23 1211)     Medical Decision Making  Differential considerations include (in no particular order): hypo/hyperglycemia, CVA, infection (cellulitis, pneumonia, cystitis, meningitis, encephalitis), alcohol intoxication, electrolyte abnormalities, drug intoxication, uremia, trauma, hypoglycemia, hypoxemia, seizures    Based on the patient's evaluation, patient appears stable for discharge home.  Hypertensive in the emergency department.  Was previously on amlodipine but daughter states that she was taken off of it since her previous doctor thought it was drop in her blood pressure too much.  When I asked how much he was drop her blood pressure, the daughter states that they never were told how low it dropped.  She was given hydralazine ×2 with her last blood pressure 154/75.  We will restart her amlodipine until she has objective data at that the amlodipine is drop in her blood pressure too much.  Her altered mental status workup showed mild hypokalemia (given PO K) but with  otherwise unremarkable.  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    She appears to have some waxing and waning memory issues.  When she arrived in the emergency department, she knew her name, birth month, birthday but did not know the year.  When I asked her name later on at the end of her ED visit, she was not able to tell me her name and stated that it was Kassandra.  She is very talkative.     The daughter has serious concerns that her mother has dementia but has not had any neurocognitive testing with neurology.  They go home to [place], [place] tomorrow.  Daughter states the medical notes and referral have not been received by a neurologist in [place] yet (per chart review, LPN sent them on 9/21/23). I personally called the primary care clinic and instructed them to resend the medical notes and referral.    We will discharge home with close neurology follow-up.  Daughter is in agreement.    Amount and/or Complexity of Data Reviewed  Labs: ordered.  Radiology: ordered.    Risk  Prescription drug management.                               Clinical Impression:   Final diagnoses:  [R41.82] AMS (altered mental status)  [R41.3] Memory loss (Primary)  [E87.6] Hypokalemia  [I10] Primary hypertension        ED Disposition Condition    Discharge Stable          ED Prescriptions       Medication Sig Dispense Start Date End Date Auth. Provider    amLODIPine (NORVASC) 5 MG tablet Take 1 tablet (5 mg total) by mouth once daily. 30 tablet 10/3/2023 -- Gilbert Pierson DO          Follow-up Information       Follow up With Specialties Details Why Contact Info    Your neurologist  Call in 3 days  HANY James Nixon, DO  10/03/23 5544

## 2023-10-03 NOTE — ED TRIAGE NOTES
Pt to ED after home health nurse visit today. Daughter states that home health nurse was supposed to discharge patient today but noticed that she seemed a little more confused and weaker than normal.

## 2023-10-03 NOTE — TELEPHONE ENCOUNTER
----- Message from Elmo Cole sent at 10/3/2023 11:58 AM CDT -----  Contact: Dr Vogt/ ED  Ashley Mak  MRN: [id number]  : 1935  PCP: No primary care provider on file.  Home Phone      [phone]  Work Phone      Not on file.  Mobile          [phone]      MESSAGE:   Dr. Tirado in ED says whatever you did on  it didn't go through. He needs referral and notes.  Verify daughter when it is sent.[phone]    Fax [phone]

## 2023-10-06 ENCOUNTER — DOCUMENT SCAN (OUTPATIENT)
Dept: HOME HEALTH SERVICES | Facility: HOSPITAL | Age: 88
End: 2023-10-06
Payer: MEDICARE

## 2023-10-26 ENCOUNTER — TELEPHONE (OUTPATIENT)
Dept: FAMILY MEDICINE | Facility: CLINIC | Age: 88
End: 2023-10-26
Payer: MEDICARE

## 2023-12-18 ENCOUNTER — EXTERNAL HOME HEALTH (OUTPATIENT)
Dept: HOME HEALTH SERVICES | Facility: HOSPITAL | Age: 88
End: 2023-12-18
Payer: MEDICARE